# Patient Record
Sex: FEMALE | Race: OTHER | HISPANIC OR LATINO | URBAN - METROPOLITAN AREA
[De-identification: names, ages, dates, MRNs, and addresses within clinical notes are randomized per-mention and may not be internally consistent; named-entity substitution may affect disease eponyms.]

---

## 2021-04-29 ENCOUNTER — EMERGENCY (EMERGENCY)
Facility: HOSPITAL | Age: 71
LOS: 1 days | Discharge: HOME | End: 2021-04-29
Admitting: EMERGENCY MEDICINE
Payer: MEDICARE

## 2021-04-29 VITALS
SYSTOLIC BLOOD PRESSURE: 130 MMHG | RESPIRATION RATE: 16 BRPM | OXYGEN SATURATION: 98 % | HEART RATE: 81 BPM | DIASTOLIC BLOOD PRESSURE: 78 MMHG | TEMPERATURE: 98 F

## 2021-04-29 LAB — SARS-COV-2 RNA SPEC QL NAA+PROBE: SIGNIFICANT CHANGE UP

## 2021-04-29 PROCEDURE — 99282 EMERGENCY DEPT VISIT SF MDM: CPT

## 2021-04-29 PROCEDURE — U0003: CPT

## 2021-04-29 PROCEDURE — U0005: CPT

## 2021-04-29 PROCEDURE — 99283 EMERGENCY DEPT VISIT LOW MDM: CPT

## 2021-04-29 NOTE — ED PROVIDER NOTE - PATIENT PORTAL LINK FT
You can access the FollowMyHealth Patient Portal offered by [facility] by registering at the following website: [email or web address]. By joining [facility]’s FollowMyHealth portal, you will also be able to view your health information using other applications (apps) compatible with our system.

## 2021-05-03 DIAGNOSIS — Z20.822 CONTACT WITH AND (SUSPECTED) EXPOSURE TO COVID-19: ICD-10-CM

## 2025-05-13 ENCOUNTER — APPOINTMENT (OUTPATIENT)
Dept: GASTROENTEROLOGY | Facility: CLINIC | Age: 75
End: 2025-05-13
Payer: MEDICARE

## 2025-05-13 ENCOUNTER — NON-APPOINTMENT (OUTPATIENT)
Age: 75
End: 2025-05-13

## 2025-05-13 VITALS
DIASTOLIC BLOOD PRESSURE: 81 MMHG | SYSTOLIC BLOOD PRESSURE: 158 MMHG | RESPIRATION RATE: 18 BRPM | OXYGEN SATURATION: 95 % | TEMPERATURE: 98 F | WEIGHT: 175.05 LBS | HEIGHT: 64 IN | HEART RATE: 92 BPM

## 2025-05-13 VITALS
TEMPERATURE: 97.9 F | WEIGHT: 173 LBS | OXYGEN SATURATION: 97 % | SYSTOLIC BLOOD PRESSURE: 136 MMHG | BODY MASS INDEX: 27.8 KG/M2 | HEIGHT: 66 IN | DIASTOLIC BLOOD PRESSURE: 100 MMHG | HEART RATE: 82 BPM

## 2025-05-13 PROBLEM — Z00.00 ENCOUNTER FOR PREVENTIVE HEALTH EXAMINATION: Status: ACTIVE | Noted: 2025-05-13

## 2025-05-13 LAB
ALBUMIN SERPL ELPH-MCNC: 4.1 G/DL — SIGNIFICANT CHANGE UP (ref 3.3–5)
ALP SERPL-CCNC: 72 U/L — SIGNIFICANT CHANGE UP (ref 40–120)
ALT FLD-CCNC: 17 U/L — SIGNIFICANT CHANGE UP (ref 10–45)
ANION GAP SERPL CALC-SCNC: 14 MMOL/L — SIGNIFICANT CHANGE UP (ref 5–17)
AST SERPL-CCNC: 23 U/L — SIGNIFICANT CHANGE UP (ref 10–40)
BASOPHILS # BLD AUTO: 0.08 K/UL — SIGNIFICANT CHANGE UP (ref 0–0.2)
BASOPHILS NFR BLD AUTO: 1 % — SIGNIFICANT CHANGE UP (ref 0–2)
BILIRUB SERPL-MCNC: 0.8 MG/DL — SIGNIFICANT CHANGE UP (ref 0.2–1.2)
BUN SERPL-MCNC: 14 MG/DL — SIGNIFICANT CHANGE UP (ref 7–23)
CALCIUM SERPL-MCNC: 9.7 MG/DL — SIGNIFICANT CHANGE UP (ref 8.4–10.5)
CHLORIDE SERPL-SCNC: 97 MMOL/L — SIGNIFICANT CHANGE UP (ref 96–108)
CO2 SERPL-SCNC: 26 MMOL/L — SIGNIFICANT CHANGE UP (ref 22–31)
CREAT SERPL-MCNC: 0.93 MG/DL — SIGNIFICANT CHANGE UP (ref 0.5–1.3)
EGFR: 64 ML/MIN/1.73M2 — SIGNIFICANT CHANGE UP
EGFR: 64 ML/MIN/1.73M2 — SIGNIFICANT CHANGE UP
EOSINOPHIL # BLD AUTO: 0.04 K/UL — SIGNIFICANT CHANGE UP (ref 0–0.5)
EOSINOPHIL NFR BLD AUTO: 0.5 % — SIGNIFICANT CHANGE UP (ref 0–6)
GLUCOSE SERPL-MCNC: 109 MG/DL — HIGH (ref 70–99)
HCT VFR BLD CALC: 44.3 % — SIGNIFICANT CHANGE UP (ref 34.5–45)
HGB BLD-MCNC: 14.5 G/DL — SIGNIFICANT CHANGE UP (ref 11.5–15.5)
IMM GRANULOCYTES NFR BLD AUTO: 0.4 % — SIGNIFICANT CHANGE UP (ref 0–0.9)
LIDOCAIN IGE QN: 29 U/L — SIGNIFICANT CHANGE UP (ref 7–60)
LYMPHOCYTES # BLD AUTO: 1.27 K/UL — SIGNIFICANT CHANGE UP (ref 1–3.3)
LYMPHOCYTES # BLD AUTO: 16.5 % — SIGNIFICANT CHANGE UP (ref 13–44)
MAGNESIUM SERPL-MCNC: 2.2 MG/DL — SIGNIFICANT CHANGE UP (ref 1.6–2.6)
MCHC RBC-ENTMCNC: 28.5 PG — SIGNIFICANT CHANGE UP (ref 27–34)
MCHC RBC-ENTMCNC: 32.7 G/DL — SIGNIFICANT CHANGE UP (ref 32–36)
MCV RBC AUTO: 87.2 FL — SIGNIFICANT CHANGE UP (ref 80–100)
MONOCYTES # BLD AUTO: 0.83 K/UL — SIGNIFICANT CHANGE UP (ref 0–0.9)
MONOCYTES NFR BLD AUTO: 10.8 % — SIGNIFICANT CHANGE UP (ref 2–14)
NEUTROPHILS # BLD AUTO: 5.44 K/UL — SIGNIFICANT CHANGE UP (ref 1.8–7.4)
NEUTROPHILS NFR BLD AUTO: 70.8 % — SIGNIFICANT CHANGE UP (ref 43–77)
NRBC BLD AUTO-RTO: 0 /100 WBCS — SIGNIFICANT CHANGE UP (ref 0–0)
PLATELET # BLD AUTO: 182 K/UL — SIGNIFICANT CHANGE UP (ref 150–400)
POTASSIUM SERPL-MCNC: 4.8 MMOL/L — SIGNIFICANT CHANGE UP (ref 3.5–5.3)
POTASSIUM SERPL-SCNC: 4.8 MMOL/L — SIGNIFICANT CHANGE UP (ref 3.5–5.3)
PROT SERPL-MCNC: 6.9 G/DL — SIGNIFICANT CHANGE UP (ref 6–8.3)
RBC # BLD: 5.08 M/UL — SIGNIFICANT CHANGE UP (ref 3.8–5.2)
RBC # FLD: 14.5 % — SIGNIFICANT CHANGE UP (ref 10.3–14.5)
SODIUM SERPL-SCNC: 137 MMOL/L — SIGNIFICANT CHANGE UP (ref 135–145)
TROPONIN T, HIGH SENSITIVITY RESULT: 8 NG/L — SIGNIFICANT CHANGE UP (ref 0–51)
WBC # BLD: 7.69 K/UL — SIGNIFICANT CHANGE UP (ref 3.8–10.5)
WBC # FLD AUTO: 7.69 K/UL — SIGNIFICANT CHANGE UP (ref 3.8–10.5)

## 2025-05-13 PROCEDURE — 99205 OFFICE O/P NEW HI 60 MIN: CPT

## 2025-05-13 PROCEDURE — 99285 EMERGENCY DEPT VISIT HI MDM: CPT

## 2025-05-13 RX ORDER — IOHEXOL 350 MG/ML
30 INJECTION, SOLUTION INTRAVENOUS ONCE
Refills: 0 | Status: COMPLETED | OUTPATIENT
Start: 2025-05-13 | End: 2025-05-13

## 2025-05-13 RX ADMIN — IOHEXOL 30 MILLILITER(S): 350 INJECTION, SOLUTION INTRAVENOUS at 22:55

## 2025-05-13 NOTE — ED ADULT TRIAGE NOTE - AS HEIGHT TYPE
----- Message from Yuliana Dias sent at 12/12/2019  9:35 AM CST -----  Hello!Makayla Candice ([birth year]) was unable to collect her urine sample today.  She will return a sample on Monday when she comes for her appointment.  Please put in a new order.  I will cancel today's order.   Thank You!  Yuliana    
UACS ordered.  
stated

## 2025-05-13 NOTE — ED ADULT NURSE NOTE - OBJECTIVE STATEMENT
Patient to the ED with complaint of upper epigastric pain nausea and vomiting, associated with decreased PO intake and BM, worsening over the past 2 weeks, seen at another facility as per family, patient diagnosed with hernia, and new onset Afib, unable to take medication ordered.  Reports new complaint of burning in upper chest area, dark urine and SOB on deep inhalation.  Denies fevers, chills.

## 2025-05-14 ENCOUNTER — APPOINTMENT (OUTPATIENT)
Dept: GASTROENTEROLOGY | Facility: CLINIC | Age: 75
End: 2025-05-14

## 2025-05-14 ENCOUNTER — INPATIENT (INPATIENT)
Facility: HOSPITAL | Age: 75
LOS: 1 days | Discharge: HOME | DRG: 392 | End: 2025-05-16
Attending: STUDENT IN AN ORGANIZED HEALTH CARE EDUCATION/TRAINING PROGRAM | Admitting: STUDENT IN AN ORGANIZED HEALTH CARE EDUCATION/TRAINING PROGRAM
Payer: MEDICARE

## 2025-05-14 DIAGNOSIS — I10 ESSENTIAL (PRIMARY) HYPERTENSION: ICD-10-CM

## 2025-05-14 DIAGNOSIS — R62.7 ADULT FAILURE TO THRIVE: ICD-10-CM

## 2025-05-14 DIAGNOSIS — Z29.9 ENCOUNTER FOR PROPHYLACTIC MEASURES, UNSPECIFIED: ICD-10-CM

## 2025-05-14 DIAGNOSIS — I48.91 UNSPECIFIED ATRIAL FIBRILLATION: ICD-10-CM

## 2025-05-14 DIAGNOSIS — Z74.09 OTHER REDUCED MOBILITY: ICD-10-CM

## 2025-05-14 LAB
ADD ON TEST-SPECIMEN IN LAB: SIGNIFICANT CHANGE UP
APPEARANCE UR: CLEAR — SIGNIFICANT CHANGE UP
BILIRUB UR-MCNC: NEGATIVE — SIGNIFICANT CHANGE UP
COLOR SPEC: YELLOW — SIGNIFICANT CHANGE UP
DIFF PNL FLD: NEGATIVE — SIGNIFICANT CHANGE UP
GLUCOSE UR QL: NEGATIVE MG/DL — SIGNIFICANT CHANGE UP
KETONES UR QL: 40 MG/DL
LEUKOCYTE ESTERASE UR-ACNC: NEGATIVE — SIGNIFICANT CHANGE UP
NITRITE UR-MCNC: NEGATIVE — SIGNIFICANT CHANGE UP
PH UR: 6 — SIGNIFICANT CHANGE UP (ref 5–8)
PROT UR-MCNC: NEGATIVE MG/DL — SIGNIFICANT CHANGE UP
SP GR SPEC: 1.01 — SIGNIFICANT CHANGE UP (ref 1–1.03)
UROBILINOGEN FLD QL: 1 MG/DL — SIGNIFICANT CHANGE UP (ref 0.2–1)

## 2025-05-14 PROCEDURE — 99222 1ST HOSP IP/OBS MODERATE 55: CPT | Mod: GC

## 2025-05-14 PROCEDURE — 99223 1ST HOSP IP/OBS HIGH 75: CPT | Mod: GC

## 2025-05-14 PROCEDURE — 76705 ECHO EXAM OF ABDOMEN: CPT | Mod: 26

## 2025-05-14 PROCEDURE — 93010 ELECTROCARDIOGRAM REPORT: CPT

## 2025-05-14 PROCEDURE — 74177 CT ABD & PELVIS W/CONTRAST: CPT | Mod: 26

## 2025-05-14 PROCEDURE — 99221 1ST HOSP IP/OBS SF/LOW 40: CPT

## 2025-05-14 PROCEDURE — 71045 X-RAY EXAM CHEST 1 VIEW: CPT | Mod: 26

## 2025-05-14 RX ORDER — ONDANSETRON HCL/PF 4 MG/2 ML
4 VIAL (ML) INJECTION EVERY 8 HOURS
Refills: 0 | Status: DISCONTINUED | OUTPATIENT
Start: 2025-05-14 | End: 2025-05-16

## 2025-05-14 RX ORDER — APIXABAN 2.5 MG/1
5 TABLET, FILM COATED ORAL EVERY 12 HOURS
Refills: 0 | Status: DISCONTINUED | OUTPATIENT
Start: 2025-05-14 | End: 2025-05-14

## 2025-05-14 RX ORDER — AMLODIPINE BESYLATE 10 MG/1
5 TABLET ORAL EVERY 24 HOURS
Refills: 0 | Status: DISCONTINUED | OUTPATIENT
Start: 2025-05-14 | End: 2025-05-14

## 2025-05-14 RX ORDER — SODIUM CHLORIDE 9 G/1000ML
1000 INJECTION, SOLUTION INTRAVENOUS ONCE
Refills: 0 | Status: COMPLETED | OUTPATIENT
Start: 2025-05-14 | End: 2025-05-14

## 2025-05-14 RX ORDER — MELATONIN 5 MG
3 TABLET ORAL AT BEDTIME
Refills: 0 | Status: DISCONTINUED | OUTPATIENT
Start: 2025-05-14 | End: 2025-05-16

## 2025-05-14 RX ORDER — METOPROLOL SUCCINATE 50 MG/1
25 TABLET, EXTENDED RELEASE ORAL DAILY
Refills: 0 | Status: DISCONTINUED | OUTPATIENT
Start: 2025-05-14 | End: 2025-05-16

## 2025-05-14 RX ORDER — AMLODIPINE BESYLATE 10 MG/1
5 TABLET ORAL EVERY 24 HOURS
Refills: 0 | Status: DISCONTINUED | OUTPATIENT
Start: 2025-05-15 | End: 2025-05-16

## 2025-05-14 RX ORDER — ACETAMINOPHEN 500 MG/5ML
1000 LIQUID (ML) ORAL ONCE
Refills: 0 | Status: COMPLETED | OUTPATIENT
Start: 2025-05-14 | End: 2025-05-14

## 2025-05-14 RX ORDER — ACETAMINOPHEN 500 MG/5ML
650 LIQUID (ML) ORAL EVERY 6 HOURS
Refills: 0 | Status: DISCONTINUED | OUTPATIENT
Start: 2025-05-14 | End: 2025-05-16

## 2025-05-14 RX ORDER — APIXABAN 2.5 MG/1
0 TABLET, FILM COATED ORAL
Qty: 0 | Refills: 0 | DISCHARGE

## 2025-05-14 RX ORDER — ONDANSETRON HCL/PF 4 MG/2 ML
4 VIAL (ML) INJECTION ONCE
Refills: 0 | Status: COMPLETED | OUTPATIENT
Start: 2025-05-14 | End: 2025-05-14

## 2025-05-14 RX ORDER — POLYETHYLENE GLYCOL-3350 AND ELECTROLYTES 236; 6.74; 5.86; 2.97; 22.74 G/274.31G; G/274.31G; G/274.31G; G/274.31G; G/274.31G
4000 POWDER, FOR SOLUTION ORAL ONCE
Refills: 0 | Status: COMPLETED | OUTPATIENT
Start: 2025-05-14 | End: 2025-05-14

## 2025-05-14 RX ORDER — MAGNESIUM, ALUMINUM HYDROXIDE 200-200 MG
30 TABLET,CHEWABLE ORAL EVERY 4 HOURS
Refills: 0 | Status: DISCONTINUED | OUTPATIENT
Start: 2025-05-14 | End: 2025-05-16

## 2025-05-14 RX ORDER — MAGNESIUM CITRATE
296 SOLUTION, ORAL ORAL ONCE
Refills: 0 | Status: COMPLETED | OUTPATIENT
Start: 2025-05-14 | End: 2025-05-14

## 2025-05-14 RX ORDER — B1/B2/B3/B5/B6/B12/VIT C/FOLIC 500-0.5 MG
1 TABLET ORAL EVERY 24 HOURS
Refills: 0 | Status: DISCONTINUED | OUTPATIENT
Start: 2025-05-15 | End: 2025-05-16

## 2025-05-14 RX ADMIN — Medication 4 MILLIGRAM(S): at 00:25

## 2025-05-14 RX ADMIN — POLYETHYLENE GLYCOL-3350 AND ELECTROLYTES 4000 MILLILITER(S): 236; 6.74; 5.86; 2.97; 22.74 POWDER, FOR SOLUTION ORAL at 18:15

## 2025-05-14 RX ADMIN — Medication 40 MILLIGRAM(S): at 18:15

## 2025-05-14 RX ADMIN — SODIUM CHLORIDE 1000 MILLILITER(S): 9 INJECTION, SOLUTION INTRAVENOUS at 00:52

## 2025-05-14 RX ADMIN — METOPROLOL SUCCINATE 25 MILLIGRAM(S): 50 TABLET, EXTENDED RELEASE ORAL at 07:17

## 2025-05-14 RX ADMIN — Medication 400 MILLIGRAM(S): at 01:10

## 2025-05-14 RX ADMIN — AMLODIPINE BESYLATE 5 MILLIGRAM(S): 10 TABLET ORAL at 14:03

## 2025-05-14 RX ADMIN — Medication 296 MILLILITER(S): at 15:47

## 2025-05-14 RX ADMIN — Medication 20 MILLIGRAM(S): at 12:26

## 2025-05-14 RX ADMIN — Medication 20 MILLIGRAM(S): at 00:24

## 2025-05-14 NOTE — H&P ADULT - HISTORY OF PRESENT ILLNESS
75F PMHx Afib (on Eliquis), HTN, RA, severe OA, uterine fibroids, duodenal diverticulum, hepatic steatosis who presented to outpatient GI this AM (Dr. Barillas) for peristent abdominal pain and inability to tolerate PO intake.  Patient's symptoms started shortly after 4/28/25 (with recent fall on 4/19/25) (CTH negative for intracranial pathology) and were described as an epigastric burning sensation with radiation to the right side of her back. She to [facility] ED x2.  At the time of her first presentation on 5/2 she was found to have newly developed afib and was started on eliquis/metoprolol. CT Angio A/P obtained at the time was significant for cholehtiasis but there was no evidence of vascular compromise or obstructive based pathology which overall did not necessitate inpatient surgical management. At the time of her second presentation on 5/7 was provided with supportive care at the time with no resolution of symptoms.     SInce her smyptoms began tai renae has been unable to toelrate oral intake and vomits after eating. She has lost 20lbs since the onset of her symtpoms and even after attmepting to drink orange juice today was profusely vomiting.  Given her inability to tolerate oral intake and ongoing pain was recomcended to present to [facility] for expedited evaluation     ED Course   Vitals: Afebrile, /81, HR 92, SpO2 95% on room air   Labs: CBC/CMP grossly unremarkable, UA with Ketones  ECG: Afib,    Imaging - CT AP: demosntrating Cholelithiasis with stones located in the region of gallbladder neck. No   pericholecystic fluid or surrounding inflammatory stranding..  Interventions: 1g ofirmev, 20mg IV famotidine, 4mg Zofran, 1L LR     PSHx- no prior abdominal surgeries   FMHX- no known fm,hx of CRC or GI releated malignancy      75F PMHx Afib (on Eliquis), HTN, RA, severe OA, uterine fibroids, duodenal diverticulum, hepatic steatosis who presented to outpatient GI this AM (Dr. Barillas) for persistent abdominal pain and inability to tolerate PO intake.  Patient's symptoms started shortly after 4/28/25 (with recent fall on 4/19/25) (CTH negative for intracranial pathology) and were described as an epigastric burning sensation with radiation to the right side of her back. She to [facility] ED x2.  At the time of her first presentation on 5/2 she was found to have newly developed afib and was started on eliquis/metoprolol. CT Angio A/P obtained at the time was significant for cholelithiases but there was no evidence of vascular compromise or obstructive based pathology which overall did not necessitate inpatient surgical management. At the time of her second presentation on 5/7 was provided with supportive care at the time with no resolution of symptoms.     Since her symptoms began the patient has been unable to tolerate oral intake and vomits after eating. She has lost 20lbs since the onset of her symptoms and even after attempting to drink orange juice today was profusely vomiting.  Given her inability to tolerate oral intake and ongoing pain was recommended to present to [facility] for expedited evaluation     ED Course   Vitals: Afebrile, /81, HR 92, SpO2 95% on room air   Labs: CBC/CMP grossly unremarkable, UA with Ketones  ECG: Afib,    Imaging - CT AP: demonstrating Cholelithiasis with stones located in the region of gallbladder neck. No   pericholecystic fluid or surrounding inflammatory stranding..  Interventions: 1g ofirmev, 20mg IV famotidine, 4mg Zofran, 1L LR     PSHx- no prior abdominal surgeries   FMHX- no known fm,hx of CRC or GI releated malignancy

## 2025-05-14 NOTE — CONSULT NOTE ADULT - SUBJECTIVE AND OBJECTIVE BOX
GASTROENTEROLOGY CONSULT NOTE  HPI:  75F PMHx Afib (on Eliquis), HTN, RA, severe OA, uterine fibroids, duodenal diverticulum, hepatic steatosis who presented to outpatient GI this AM (Dr. Barillas) for persistent abdominal pain and inability to tolerate PO intake.  Patient's symptoms started shortly after 4/28/25 (with recent fall on 4/19/25) (CTH negative for intracranial pathology) and were described as an epigastric burning sensation with radiation to the right side of her back. She to [facility] ED x2.  At the time of her first presentation on 5/2 she was found to have newly developed afib and was started on eliquis/metoprolol. CT Angio A/P obtained at the time was significant for cholelithiases but there was no evidence of vascular compromise or obstructive based pathology which overall did not necessitate inpatient surgical management. At the time of her second presentation on 5/7 was provided with supportive care at the time with no resolution of symptoms.     Since her symptoms began the patient has been unable to tolerate oral intake and vomits after eating. She has lost 20lbs since the onset of her symptoms and even after attempting to drink orange juice today was profusely vomiting.  Given her inability to tolerate oral intake and ongoing pain was recommended to present to [facility] for expedited evaluation     ED Course   Vitals: Afebrile, /81, HR 92, SpO2 95% on room air   Labs: CBC/CMP grossly unremarkable, UA with Ketones  ECG: Afib,    Imaging - CT AP: demonstrating Cholelithiasis with stones located in the region of gallbladder neck. No   pericholecystic fluid or surrounding inflammatory stranding..  Interventions: 1g ofirmev, 20mg IV famotidine, 4mg Zofran, 1L LR     PMHx - Afib, HTN, RA, anxiety  PSHx - no abdominal surgeries  Rx - amlodipine 5 mg daily, omeprazole --> pepcid, miralax, eliquis 5mg bid, gabapentin, metoprolol tartrate er 25 mg daily, valsartan 160 mg daily  Supplements/herbs/OTC - FA  A/C or NSAIDs? - denies  FMHx - no known FMHx of CRC or Gi related malignancy; no IBD  Allergies - NKDA  EtOH - 2-3x/week (drinks a beer)  Smoking - never smoker  Drugs - denies  ?  EGD - doesnt recall  1 month ago, stool based test, doesn't know result for this, Dr anthony ([facility], [address])  No prior colonoscopy      Home Medications:  AMLODIPINE   TAB 5MG:  (14 May 2025 05:31)  ELIQUIS 5 MG Tablet: TAKE 1 TABLET (5 MG TOTAL) BY MOUTH 2 (TWO) TIMES A DAY. (14 May 2025 05:31)  METOPROLOL SUCCINATE ER 25 MG Tablet Extended Release 24 Hour: TAKE 1 TABLET BY MOUTH ONCE A DAY (14 May 2025 05:31)  OMEPRAZOLE DR 40 MG Capsule Delayed Release: TAKE 1 TABLET BY MOUTH ONCE A DAY (14 May 2025 05:31)  VALSARTAN    TAB 160MG:  (14 May 2025 05:31)    MEDICATIONS:  MEDICATIONS  (STANDING):  amLODIPine   Tablet 5 milliGRAM(s) Oral every 24 hours  famotidine Injectable 20 milliGRAM(s) IV Push every 24 hours  magnesium citrate Oral Solution 296 milliLiter(s) Oral once  metoprolol succinate ER 25 milliGRAM(s) Oral daily  pantoprazole  Injectable 40 milliGRAM(s) IV Push every 24 hours    MEDICATIONS  (PRN):  acetaminophen     Tablet .. 650 milliGRAM(s) Oral every 6 hours PRN Temp greater or equal to 38C (100.4F), Mild Pain (1 - 3)  aluminum hydroxide/magnesium hydroxide/simethicone Suspension 30 milliLiter(s) Oral every 4 hours PRN Dyspepsia  melatonin 3 milliGRAM(s) Oral at bedtime PRN Insomnia  ondansetron Injectable 4 milliGRAM(s) IV Push every 8 hours PRN Nausea and/or Vomiting      REVIEW OF SYSTEMS:  All other 10 review of systems is negative unless indicated above.    Vital Signs Last 24 Hrs  T(C): 36.9 (14 May 2025 09:26), Max: 37.1 (13 May 2025 23:51)  T(F): 98.4 (14 May 2025 09:26), Max: 98.8 (13 May 2025 23:51)  HR: 79 (14 May 2025 09:26) (70 - 92)  BP: 154/93 (14 May 2025 09:26) (117/67 - 174/84)  BP(mean): --  RR: 18 (14 May 2025 09:26) (18 - 18)  SpO2: 95% (14 May 2025 09:26) (95% - 97%)    Parameters below as of 14 May 2025 09:26  Patient On (Oxygen Delivery Method): room air      PHYSICAL EXAM:  General: lying in bed, in no acute distress  HEENT: Neck supple, mmm, no jvd  Lungs: Normal respiratory effort, no intercostal retractions  Cardiovascular: regular rate  Abdomen: Soft, non-tender non-distended; No rebound or guarding  Extremities: wwp, no cce  Neurological: LICONA, speech fluent  Skin: Warm and dry. No obvious rash      LABS:                        14.5   7.69  )-----------( 182      ( 13 May 2025 22:14 )             44.3     05-13    137  |  97  |  14  ----------------------------<  109[H]  4.8   |  26  |  0.93    Ca    9.7      13 May 2025 22:14  Mg     2.2     05-13    TPro  6.9  /  Alb  4.1  /  TBili  0.8  /  DBili  x   /  AST  23  /  ALT  17  /  AlkPhos  72  05-13    Urinalysis with Rflx Culture (collected 14 May 2025 00:41)    RADIOLOGY & ADDITIONAL STUDIES:     Reviewed

## 2025-05-14 NOTE — CONSULT NOTE ADULT - ASSESSMENT
75F PMH HTN, Afib (on Eliquis), RA, severe OA, uterine fibroids, duodenal diverticulum, hepatic steatosis who presented to outpatient GI this AM (Dr. Barillas) for persistent abdominal pain, N/V, weight loss and inability to tolerate PO intake, admitted to [facility] for workup and management of these symptoms. GI following, planning for EGD/colonoscopy tomorrow, 5/15. Cardiology consulted for pre-operative assessment prior to EGD/colonoscopy 5/15.    Review of studies:  ECG 5/14/25 4:43: afib, rate 72  ECG 5/14/25 19:05: afib, rate 72    Home meds (cardiac): eliquis 5mg BID, toprol 25mg qd, amlodipine 5mg qd, valsartan 160mg qd    Recommendations:    #Pre-operative CV assessment/evaluation   -patient without known history of CHF, MI, or CAD  -poor historian and unable to clarify true exertional symptoms however activity severely limited by fatigue and OA pain. Poor METS. Denies any chest pain with exertion or at rest.   -trops negative, proBNP 814  -ECG afib, non-ischemic   -on exam patient warm/well-perfused and euvolemic   -if able, try to obtain records from [facility] regarding any echo that was done there surrounding her afib diagnosis. Given possible SOB with exertion would obtain TTE if unable to obtain these records.  -patient is low-intermediate risk for low risk procedure   -c/w home toprol 25mg qd (hold for SBP <100, HR <60)  -c/w home amlodipine 5mg qd (hold for SBP <100, HR <60)  -can hold home valsartan for now, however seems BPs are labile so would restart if SBPs consistently >150      #afib   -currently rate controlled  -CHADSVASC at least 3  -c/w home eliquis 5mg BID. If unable to tolerate PO eliquis would start heparin drip or lovenox for full AC    Preliminary recommendations pending discussion with attending cardiologist   Recommendations not final until attested by attending

## 2025-05-14 NOTE — H&P ADULT - NSHPPHYSICALEXAM_GEN_ALL_CORE
T(C): 36.3 (05-14-25 @ 05:06), Max: 37.1 (05-13-25 @ 23:51)  HR: 75 (05-14-25 @ 05:06) (75 - 92)  BP: 174/84 (05-14-25 @ 05:06) (117/67 - 174/84)  RR: 18 (05-14-25 @ 05:06) (18 - 18)  SpO2: 97% (05-14-25 @ 05:06) (95% - 97%)    CONSTITUTIONAL: NAD   ENMT: MMM   RESP: CTA b/l, no WRR  CV: irregular rythym   GI: tenderness to palpation in all quadrants   MSK: moves all extmies   PSYCH: Appropriate insight/judgment; A+O x 3, mood and affect appropriate, recent/remote memory intact

## 2025-05-14 NOTE — DIETITIAN INITIAL EVALUATION ADULT - NSFNSGIIOFT_GEN_A_CORE
Pt endorsed nausea/emesis (last emesis 5/13) and denied diarrhea/constipation, stated last BM 5/13.

## 2025-05-14 NOTE — DIETITIAN INITIAL EVALUATION ADULT - PROBLEM SELECTOR PLAN 1
Patient with 20lb weight loss since onset of abdominal pain. Unable to tolerate PO intake at this time including medications at this time. GI recommended urgent inpatient evaluation   - f/u GI recs   - maintain NPO status at this time pending provisional GI eval  - Zofran prn for nausea   - tylenols  650mg prn for moderate pain   - convert PO medications to IV alternatives if continues to be unable to tolerate medications   - trend BMP to monitor for lytes derangements   - CT without evidence of obstruction

## 2025-05-14 NOTE — ED PROVIDER NOTE - CLINICAL SUMMARY MEDICAL DECISION MAKING FREE TEXT BOX
Triage VS normal  check labs/electrolytes  symptomatic treatment  CTAP w/ PO/IV contrast  admit to medicine for EGD/colonoscopy

## 2025-05-14 NOTE — CONSULT NOTE ADULT - ATTENDING COMMENTS
75F PMHx Afib (on Eliquis), HTN, RA, severe OA, uterine fibroids, duodenal diverticulum, hepatic steatosis referred by patient (Landencarol Lopez) for evaluation of ongoing abdominal pain, inability to tolerate PO, nausea/vomiting, chronic constipation, and 20+ lb weight loss over the past few months.   Will evaluate upper and lower GI tract for complaints of Nausea and vomiting and weight loss.   Patient to trial mag citrate prep and clear liquid diet   Cardiac optimization  EGD colonoscopy 5/14

## 2025-05-14 NOTE — H&P ADULT - ATTENDING COMMENTS
Ptn states still having epigastric abd pain, tolerated metop pill this AM but intermittently unable to tolerate meds at home.  Able to swallow both solids and liquids however unable to keep down after swallowing and will throw up solids in particular.  Able to keep down water and some seltzer water, symptoms and weight loss ongoing since end of April.  Daughter bedside corroborated account, all questions answered.  On exam, abd distended habitus, tender to palpation in epigastric region, no rebound or guarding, A+Ox4.  - suspect etiology likely worsened gastritis/PUD vs malignancy vs structural (though ptn able to initially swallow), appreciate GI re EGD+/- colonoscopy, though doubt ptn ability to tolerate prep  - no s/s of bleed, labs benign, CTAP only w gallstones.  - medically ready after GI investigation Ptn states still having epigastric abd pain, tolerated metop pill this AM but intermittently unable to tolerate meds at home.  Able to swallow both solids and liquids however unable to keep down after swallowing and will throw up solids in particular.  Able to keep down water and some seltzer water, symptoms and weight loss ongoing since end of April.  Daughter bedside corroborated account, all questions answered.  On exam, abd distended habitus, tender to palpation in epigastric region, no rebound or guarding, A+Ox4.  - suspect etiology likely worsened gastritis/PUD vs malignancy vs structural (though ptn able to initially swallow), appreciate GI re EGD+/- colonoscopy, though doubt ptn ability to tolerate prep  - no s/s of bleed, labs benign, CTAP only w gallstones..  - medically ready after GI investigation

## 2025-05-14 NOTE — DIETITIAN INITIAL EVALUATION ADULT - OTHER CALCULATIONS
*Using +10% ideal body weight (132 pounds)  as pt is >120% ideal body weight. Needs adjusted for advanced age, clinical status. ideal body weight: 120 pounds; % ideal body weight: 146%

## 2025-05-14 NOTE — DIETITIAN INITIAL EVALUATION ADULT - PERTINENT LABORATORY DATA
05-13    137  |  97  |  14  ----------------------------<  109[H]  4.8   |  26  |  0.93    Ca    9.7      13 May 2025 22:14  Mg     2.2     05-13    TPro  6.9  /  Alb  4.1  /  TBili  0.8  /  DBili  x   /  AST  23  /  ALT  17  /  AlkPhos  72  05-13

## 2025-05-14 NOTE — H&P ADULT - NSHPLABSRESULTS_GEN_ALL_CORE
LABS:  cret                        14.5   7.69  )-----------( 182      ( 13 May 2025 22:14 )             44.3     05-13    137  |  97  |  14  ----------------------------<  109[H]  4.8   |  26  |  0.93    Ca    9.7      13 May 2025 22:14  Mg     2.2     05-13    TPro  6.9  /  Alb  4.1  /  TBili  0.8  /  DBili  x   /  AST  23  /  ALT  17  /  AlkPhos  72  05-13

## 2025-05-14 NOTE — ED ADULT NURSE REASSESSMENT NOTE - NS ED NURSE REASSESS COMMENT FT1
Patients b/p 174/84 HR 75 Spoke to Dr. Garcia stated to send patient up, he will have b/p rechecked on arrival administer medication on unit.

## 2025-05-14 NOTE — CONSULT NOTE ADULT - SUBJECTIVE AND OBJECTIVE BOX
HPI: 75F PMH HTN, Afib (on Eliquis), RA, severe OA, uterine fibroids, duodenal diverticulum, hepatic steatosis who presented to outpatient GI this AM (Dr. Barillas) for persistent abdominal pain and inability to tolerate PO intake. Patient's symptoms started shortly after 4/28/25 (with recent fall on 4/19/25) (CTH negative for intracranial pathology) and were described as an epigastric burning sensation with radiation to the right side of her back. She to [facility] ED x2. At the time of her first presentation on 5/2 she was found to have newly developed afib and was started on eliquis/metoprolol. CT Angio A/P obtained at the time was significant for cholelithiases but there was no evidence of vascular compromise or obstructive based pathology which overall did not necessitate inpatient surgical management. At the time of her second presentation on 5/7 was provided with supportive care at the time with no resolution of symptoms. Since her symptoms began the patient has been unable to tolerate oral intake and vomits after eating. She has lost 20lbs since the onset of her symptoms and even after attempting to drink orange juice today was profusely vomiting.  Given her inability to tolerate oral intake and ongoing pain was recommended to present to [facility] for expedited evaluation. CT A/P showing cholelithiasis with stones located in the region of gallbladder neck, no pericholecystic fluid or surrounding inflammatory stranding. Patient admitted to [facility] for workup and management of ongoing abdominal pain, inability to tolerate PO, nausea/vomiting, chronic constipation, and 20+ lb weight loss over the past few months. GI consulted, planning for EGD/colonoscopy tomorrow, 5/15. Cardiology consulted for pre-operative assessment prior to EGD/colonoscopy 5/15. HPI: 75F PMH HTN, Afib (on Eliquis), RA, severe OA, uterine fibroids, duodenal diverticulum, hepatic steatosis who presented to outpatient GI this AM (Dr. Barillas) for persistent abdominal pain and inability to tolerate PO intake. Patient's symptoms started shortly after 25 (with recent fall on 25) (CTH negative for intracranial pathology) and were described as an epigastric burning sensation with radiation to the right side of her back. She to [facility] ED x2. At the time of her first presentation on  she was found to have newly developed afib and was started on eliquis/metoprolol. CT Angio A/P obtained at the time was significant for cholelithiases but there was no evidence of vascular compromise or obstructive based pathology which overall did not necessitate inpatient surgical management. At the time of her second presentation on  was provided with supportive care at the time with no resolution of symptoms. Since her symptoms began the patient has been unable to tolerate oral intake and vomits after eating. She has lost 20lbs since the onset of her symptoms and even after attempting to drink orange juice today was profusely vomiting.  Given her inability to tolerate oral intake and ongoing pain was recommended to present to [facility] for expedited evaluation. CT A/P showing cholelithiasis with stones located in the region of gallbladder neck, no pericholecystic fluid or surrounding inflammatory stranding. Patient admitted to [facility] for workup and management of ongoing abdominal pain, inability to tolerate PO, nausea/vomiting, chronic constipation, and 20+ lb weight loss over the past few months. GI consulted, planning for EGD/colonoscopy tomorrow, 5/15. Cardiology consulted for pre-operative assessment prior to EGD/colonoscopy 5/15.    SUBJECTIVE / CONSULTANT HPI: Patient seen and examined at bedside. Currently denies any chest pain or SOB but admits to abdominal pain and nausea. Denies history of CAD, MI, cardiac stents, CHF or stroke. Reports she has not been taking her medications consistently the past few weeks    #[id number] Barber used for interview     PHYSICAL EXAM:    General: lying in bed in NAD   HEENT: NC/AT; MMM  Cardiovascular: +S1/S2, irregular rhythm, no murmurs   Respiratory: CTA B/L; no W/R/C  Gastrointestinal: +TTP epigastric region   Extremities: WWP; b/l LE non-pitting edema at ankles with b/l varicose veins/venous stasis changes   Neurological: AAOx3    VITAL SIGNS:  Vital Signs Last 24 Hrs  T(C): 36.4 (14 May 2025 21:22), Max: 37.2 (14 May 2025 16:12)  T(F): 97.6 (14 May 2025 21:22), Max: 98.9 (14 May 2025 16:12)  HR: 70 (14 May 2025 21:22) (66 - 79)  BP: 105/69 (14 May 2025 21:22) (105/69 - 174/84)  BP(mean): 106 (14 May 2025 16:12) (106 - 106)  RR: 18 (14 May 2025 21:22) (18 - 18)  SpO2: 95% (14 May 2025 21:22) (95% - 97%)    Parameters below as of 14 May 2025 21:22  Patient On (Oxygen Delivery Method): room air          MEDICATIONS:  MEDICATIONS  (STANDING):  famotidine Injectable 20 milliGRAM(s) IV Push every 24 hours  metoprolol succinate ER 25 milliGRAM(s) Oral daily  pantoprazole  Injectable 40 milliGRAM(s) IV Push every 24 hours    MEDICATIONS  (PRN):  acetaminophen     Tablet .. 650 milliGRAM(s) Oral every 6 hours PRN Temp greater or equal to 38C (100.4F), Mild Pain (1 - 3)  aluminum hydroxide/magnesium hydroxide/simethicone Suspension 30 milliLiter(s) Oral every 4 hours PRN Dyspepsia  melatonin 3 milliGRAM(s) Oral at bedtime PRN Insomnia  ondansetron Injectable 4 milliGRAM(s) IV Push every 8 hours PRN Nausea and/or Vomiting      ALLERGIES:  Allergies    No Known Allergies    Intolerances        LABS:                        14.5   7.69  )-----------( 182      ( 13 May 2025 22:14 )             44.3     05-13    137  |  97  |  14  ----------------------------<  109[H]  4.8   |  26  |  0.93    Ca    9.7      13 May 2025 22:14  Mg     2.2     05-13    TPro  6.9  /  Alb  4.1  /  TBili  0.8  /  DBili  x   /  AST  23  /  ALT  17  /  AlkPhos  72  05-13      Urinalysis Basic - ( 14 May 2025 00:41 )    Color: Yellow / Appearance: Clear / S.009 / pH: x  Gluc: x / Ketone: x  / Bili: Negative / Urobili: 1.0 mg/dL   Blood: x / Protein: Negative mg/dL / Nitrite: Negative   Leuk Esterase: Negative / RBC: x / WBC x   Sq Epi: x / Non Sq Epi: x / Bacteria: x      CAPILLARY BLOOD GLUCOSE          RADIOLOGY & ADDITIONAL TESTS: Reviewed. HPI: 75F PMH HTN, Afib (on Eliquis), RA, severe OA, uterine fibroids, duodenal diverticulum, hepatic steatosis who presented to outpatient GI this AM (Dr. Barillas) for persistent abdominal pain and inability to tolerate PO intake. Patient's symptoms started shortly after 25 (with recent fall on 25) (CTH negative for intracranial pathology) and were described as an epigastric burning sensation with radiation to the right side of her back. She to [facility] ED x2. At the time of her first presentation on  she was found to have newly developed afib and was started on eliquis/metoprolol. CT Angio A/P obtained at the time was significant for cholelithiases but there was no evidence of vascular compromise or obstructive based pathology which overall did not necessitate inpatient surgical management. At the time of her second presentation on  was provided with supportive care at the time with no resolution of symptoms. Since her symptoms began the patient has been unable to tolerate oral intake and vomits after eating. She has lost 20lbs since the onset of her symptoms and even after attempting to drink orange juice today was profusely vomiting.  Given her inability to tolerate oral intake and ongoing pain was recommended to present to [facility] for expedited evaluation. CT A/P showing cholelithiasis with stones located in the region of gallbladder neck, no pericholecystic fluid or surrounding inflammatory stranding. Patient admitted to [facility] for workup and management of ongoing abdominal pain, inability to tolerate PO, nausea/vomiting, chronic constipation, and 20+ lb weight loss over the past few months. GI consulted, planning for EGD/colonoscopy tomorrow, 5/15. Cardiology consulted for pre-operative assessment prior to EGD/colonoscopy 5/15.    SUBJECTIVE / CONSULTANT HPI: Patient seen and examined at bedside. Currently denies any chest pain or SOB but admits to abdominal pain and nausea. Denies history of CAD, MI, cardiac stents, CHF or stroke. Reports she has not been taking her medications consistently the past few weeks due to nausea and vomiting and inability to keep them down. Ambulates with both cane and walker but also needs assistance with walking. Reports her activity is severely limited by diffuse body pain/aches and leg pain 2/2 to her arthritis. When asked if she gets any chest pain or SOB with ambulating, she says she can barely make it 1-2 blocks without getting chest pain and SOB. However when asked where the chest pain was she pointed to her epigastric region- says she has a hernia there that burns a lot. Says she also gets very tired with minimal exertion lately. She stated that she "suffocates" when she walks however when asked how far she can walk until she suffocates she says that she just keeps walking and does not need to stop and catch her breath. She reports nausea and vomiting for the past 4 weeks but prior to that she said she was not having any chest pain or SOB with walking. Just gets tired after 1 block. Says her whole body hurts. Says she sees a physician Dr. Christensen but unclear if he is a cardiologist? Otherwise does not follow with a cardiologist as outpatient. Reports she was at [facility] a few weeks ago for a fall and was given eliquis at that time. Says no one told her she has afib, she was unaware of this, she was just given new medications by a doctor at [facility] but she does not know what they were for.    #[id number] Barber used for interview     PHYSICAL EXAM:    General: lying in bed in NAD   HEENT: NC/AT; MMM  Cardiovascular: +S1/S2, irregular rhythm, no murmurs   Respiratory: CTA B/L; no W/R/C  Gastrointestinal: +TTP epigastric region   Extremities: WWP; b/l LE non-pitting edema at ankles with b/l varicose veins/venous stasis changes   Neurological: AAOx3    VITAL SIGNS:  Vital Signs Last 24 Hrs  T(C): 36.4 (14 May 2025 21:22), Max: 37.2 (14 May 2025 16:12)  T(F): 97.6 (14 May 2025 21:22), Max: 98.9 (14 May 2025 16:12)  HR: 70 (14 May 2025 21:22) (66 - 79)  BP: 105/69 (14 May 2025 21:22) (105/69 - 174/84)  BP(mean): 106 (14 May 2025 16:12) (106 - 106)  RR: 18 (14 May 2025 21:22) (18 - 18)  SpO2: 95% (14 May 2025 21:22) (95% - 97%)    Parameters below as of 14 May 2025 21:22  Patient On (Oxygen Delivery Method): room air          MEDICATIONS:  MEDICATIONS  (STANDING):  famotidine Injectable 20 milliGRAM(s) IV Push every 24 hours  metoprolol succinate ER 25 milliGRAM(s) Oral daily  pantoprazole  Injectable 40 milliGRAM(s) IV Push every 24 hours    MEDICATIONS  (PRN):  acetaminophen     Tablet .. 650 milliGRAM(s) Oral every 6 hours PRN Temp greater or equal to 38C (100.4F), Mild Pain (1 - 3)  aluminum hydroxide/magnesium hydroxide/simethicone Suspension 30 milliLiter(s) Oral every 4 hours PRN Dyspepsia  melatonin 3 milliGRAM(s) Oral at bedtime PRN Insomnia  ondansetron Injectable 4 milliGRAM(s) IV Push every 8 hours PRN Nausea and/or Vomiting      ALLERGIES:  Allergies    No Known Allergies    Intolerances        LABS:                        14.5   7.69  )-----------( 182      ( 13 May 2025 22:14 )             44.3     05-    137  |  97  |  14  ----------------------------<  109[H]  4.8   |  26  |  0.93    Ca    9.7      13 May 2025 22:14  Mg     2.2     -    TPro  6.9  /  Alb  4.1  /  TBili  0.8  /  DBili  x   /  AST  23  /  ALT  17  /  AlkPhos  72  05-13      Urinalysis Basic - ( 14 May 2025 00:41 )    Color: Yellow / Appearance: Clear / S.009 / pH: x  Gluc: x / Ketone: x  / Bili: Negative / Urobili: 1.0 mg/dL   Blood: x / Protein: Negative mg/dL / Nitrite: Negative   Leuk Esterase: Negative / RBC: x / WBC x   Sq Epi: x / Non Sq Epi: x / Bacteria: x      CAPILLARY BLOOD GLUCOSE          RADIOLOGY & ADDITIONAL TESTS: Reviewed.

## 2025-05-14 NOTE — PROGRESS NOTE ADULT - PROBLEM SELECTOR PLAN 5
Fluids:s/p LR bolus   Electrolytes: replete prn   Diet: NPO   DVT PPx: eliquis   GI PPx:  PPI   Dispo: [facility] Fluids: s/p 1L LR bolus   Electrolytes: replete prn   Diet: NPO except medications  DVT PPx: previously on eliquis   GI PPx: PPI   Dispo: F

## 2025-05-14 NOTE — PROGRESS NOTE ADULT - PROBLEM SELECTOR PLAN 1
Patient with 20lb weight loss since onset of abdominal pain. Unable to tolerate PO intake at this time including medications at this time. GI recommended urgent inpatient evaluation   - f/u GI recs   - maintain NPO status except medications pending further GI   - Zofran prn for nausea   - tylenols  650mg prn for moderate pain   - convert PO medications to IV alternatives if continues to be unable to tolerate medications   - trend BMP to monitor for lytes derangements   - CT without evidence of obstruction Patient with 20lb weight loss since onset of abdominal pain. Unable to tolerate PO intake at this time including medications at this time. GI recommended urgent inpatient evaluation.   - f/u GI recs   - maintain NPO status except medications pending further GI   - Zofran prn for nausea   - tylenols  650mg prn for moderate pain   - convert PO medications to IV alternatives if continues to be unable to tolerate medications   - trend BMP to monitor for lytes derangements   - CT without evidence of obstruction Patient with 20lb weight loss since onset of abdominal pain. Unable to tolerate PO intake at this time including medications at this time. GI recommended urgent inpatient evaluation. Noted to have several weeks of abdominal pain, N/V/ constipation. Recent outside non-contrast CT imaging with e/o cholelithiasis but otherwise unrevealing.   - f/u GI recs   - maintain NPO status except medications pending further GI   - Zofran prn for nausea   - tylenols  650mg prn for moderate pain   - convert PO medications to IV alternatives if continues to be unable to tolerate medications   - trend BMP to monitor for lytes derangements   - CT without evidence of obstruction

## 2025-05-14 NOTE — H&P ADULT - PROBLEM SELECTOR PLAN 5
Fluids:  Electrolytes:  Diet: NPO   DVT PPx: eliquis   GI PPx:  PPI   Dispo: [facility] Fluids:s/p LR bolus   Electrolytes: replete prn   Diet: NPO   DVT PPx: eliquis   GI PPx:  PPI   Dispo: [facility] baseline intermittently ambulates w cane or walker at home Referred To Plastics For Closure Text (Leave Blank If You Do Not Want): After obtaining clear surgical margins the patient was sent to plastics for surgical repair.  The patient understands they will receive post-surgical care and follow-up from Dr. Jones.

## 2025-05-14 NOTE — H&P ADULT - PROBLEM SELECTOR PLAN 4
Pt w hx of newly diagnosed afib, started on Eliquis and metoprolol 25mg ER   - has been unable to tolerate Eliquis for the last week, as such can start heparin gtt following coag profile  - can resume    - if cannot tolerate medications by mouth can utilize heparin gtt Pt w hx of newly diagnosed afib, started on Eliquis and metoprolol 25mg ER   - has been unable to tolerate Eliquis/BB for the last week, as such can start heparin gtt following coag profile if still unable to tolerate oral   - can resume  BB via IV route if not tolerting oral

## 2025-05-14 NOTE — PROGRESS NOTE ADULT - SUBJECTIVE AND OBJECTIVE BOX
Patient is a 75y old  Female who presents with a chief complaint of Inability to tolerate PO/ Gastric Pain (14 May 2025 05:04)      OVERNIGHT EVENTS: NAEON    SUBJECTIVE: Patient seen at the bedside this morning with  ([id number]) continues to have nausea and NBNB vomiting states improved with pepcid. No fevers or chills, no SOB. no blood in stool or melena. Chest pain related to retching and 2/2 known hernia. Continues to have epigastric pain.     ROS: otherwise negative      T(C): 36.8 (25 @ 06:39), Max: 37.1 (25 @ 23:51)  HR: 70 (25 @ 06:39) (70 - 92)  BP: 162/98 (25 @ 06:39) (117/67 - 174/84)  RR: 18 (25 @ 06:39) (18 - 18)  SpO2: 96% (25 @ 06:39) (95% - 97%)  Wt(kg): --Vital Signs Last 24 Hrs  T(C): 36.8 (14 May 2025 06:39), Max: 37.1 (13 May 2025 23:51)  T(F): 98.3 (14 May 2025 06:39), Max: 98.8 (13 May 2025 23:51)  HR: 70 (14 May 2025 06:39) (70 - 92)  BP: 162/98 (14 May 2025 06:39) (117/67 - 174/84)  BP(mean): --  RR: 18 (14 May 2025 06:39) (18 - 18)  SpO2: 96% (14 May 2025 06:39) (95% - 97%)    Parameters below as of 14 May 2025 06:39  Patient On (Oxygen Delivery Method): room air        PHYSICAL EXAM:  Constitutional: resting comfortably in bed; NAD  Eyes: PERRL, EOMI  ENT: no nasal discharge; MMM  Neck: supple; no JVD or thyromegaly  Respiratory: CTA B/L; no W/R/R, no retractions  Cardiac: +S1/S2; RRR; no M/R/G  Gastrointestinal: soft, epigastric tenderness, nondistended; no rebound or guarding; +BSx4  Extremities: WWP, no clubbing or cyanosis; no peripheral edema. varicose veins bilaterally. Capillary refill <2 sec  Musculoskeletal: NROM x4; no joint swelling, tenderness or erythema  Vascular: 2+ radial, DP/PT pulses B/L  Dermatologic: skin warm, dry and intact; no rashes, wounds, or scars  Neurologic: AAOx3  Psychiatric: tangential with conversation.     LABS:                        14.5   7.69  )-----------( 182      ( 13 May 2025 22:14 )             44.3     05-    137  |  97  |  14  ----------------------------<  109[H]  4.8   |  26  |  0.93    Ca    9.7      13 May 2025 22:14  Mg     2.2         TPro  6.9  /  Alb  4.1  /  TBili  0.8  /  DBili  x   /  AST  23  /  ALT  17  /  AlkPhos  72  05        Urinalysis Basic - ( 14 May 2025 00:41 )    Color: Yellow / Appearance: Clear / S.009 / pH: x  Gluc: x / Ketone: x  / Bili: Negative / Urobili: 1.0 mg/dL   Blood: x / Protein: Negative mg/dL / Nitrite: Negative   Leuk Esterase: Negative / RBC: x / WBC x   Sq Epi: x / Non Sq Epi: x / Bacteria: x      CAPILLARY BLOOD GLUCOSE            Urinalysis Basic - ( 14 May 2025 00:41 )    Color: Yellow / Appearance: Clear / S.009 / pH: x  Gluc: x / Ketone: x  / Bili: Negative / Urobili: 1.0 mg/dL   Blood: x / Protein: Negative mg/dL / Nitrite: Negative   Leuk Esterase: Negative / RBC: x / WBC x   Sq Epi: x / Non Sq Epi: x / Bacteria: x        MEDICATIONS  (STANDING):  apixaban 5 milliGRAM(s) Oral every 12 hours  metoprolol succinate ER 25 milliGRAM(s) Oral daily    MEDICATIONS  (PRN):  acetaminophen     Tablet .. 650 milliGRAM(s) Oral every 6 hours PRN Temp greater or equal to 38C (100.4F), Mild Pain (1 - 3)  aluminum hydroxide/magnesium hydroxide/simethicone Suspension 30 milliLiter(s) Oral every 4 hours PRN Dyspepsia  melatonin 3 milliGRAM(s) Oral at bedtime PRN Insomnia  ondansetron Injectable 4 milliGRAM(s) IV Push every 8 hours PRN Nausea and/or Vomiting      RADIOLOGY & ADDITIONAL TESTS: Reviewed   Patient is a 75y old  Female who presents with a chief complaint of Inability to tolerate PO/ Gastric Pain (14 May 2025 05:04)      OVERNIGHT EVENTS: NAEON    SUBJECTIVE: Patient seen at the bedside this morning with  ([id number]) continues to have nausea and NBNB vomiting states improved with pepcid. No fevers or chills, no SOB. no blood in stool or melena. Chest pain related to retching and 2/2 known hernia. Continues to have epigastric pain.   no family hx of cancer, no abdominal surgeries in the past.     ROS: otherwise negative      T(C): 36.8 (25 @ 06:39), Max: 37.1 (25 @ 23:51)  HR: 70 (25 @ 06:39) (70 - 92)  BP: 162/98 (25 @ 06:39) (117/67 - 174/84)  RR: 18 (25 @ 06:39) (18 - 18)  SpO2: 96% (25 @ 06:39) (95% - 97%)  Wt(kg): --Vital Signs Last 24 Hrs  T(C): 36.8 (14 May 2025 06:39), Max: 37.1 (13 May 2025 23:51)  T(F): 98.3 (14 May 2025 06:39), Max: 98.8 (13 May 2025 23:51)  HR: 70 (14 May 2025 06:39) (70 - 92)  BP: 162/98 (14 May 2025 06:39) (117/67 - 174/84)  BP(mean): --  RR: 18 (14 May 2025 06:39) (18 - 18)  SpO2: 96% (14 May 2025 06:39) (95% - 97%)    Parameters below as of 14 May 2025 06:39  Patient On (Oxygen Delivery Method): room air        PHYSICAL EXAM:  Constitutional: resting comfortably in bed; NAD  Eyes: PERRL, EOMI  ENT: no nasal discharge; MMM  Neck: supple; no JVD or thyromegaly  Respiratory: CTA B/L; no W/R/R, no retractions  Cardiac: +S1/S2; RRR; no M/R/G  Gastrointestinal: soft, epigastric tenderness, nondistended; no rebound or guarding; +BSx4  Extremities: WWP, no clubbing or cyanosis; no peripheral edema. varicose veins bilaterally. Capillary refill <2 sec  Musculoskeletal: NROM x4; no joint swelling, tenderness or erythema  Vascular: 2+ radial, DP/PT pulses B/L  Dermatologic: skin warm, dry and intact; no rashes, wounds, or scars  Neurologic: AAOx3  Psychiatric: tangential with conversation.     LABS:                        14.5   7.69  )-----------( 182      ( 13 May 2025 22:14 )             44.3     05-    137  |  97  |  14  ----------------------------<  109[H]  4.8   |  26  |  0.93    Ca    9.7      13 May 2025 22:14  Mg     2.2         TPro  6.9  /  Alb  4.1  /  TBili  0.8  /  DBili  x   /  AST  23  /  ALT  17  /  AlkPhos  72  05-        Urinalysis Basic - ( 14 May 2025 00:41 )    Color: Yellow / Appearance: Clear / S.009 / pH: x  Gluc: x / Ketone: x  / Bili: Negative / Urobili: 1.0 mg/dL   Blood: x / Protein: Negative mg/dL / Nitrite: Negative   Leuk Esterase: Negative / RBC: x / WBC x   Sq Epi: x / Non Sq Epi: x / Bacteria: x      CAPILLARY BLOOD GLUCOSE            Urinalysis Basic - ( 14 May 2025 00:41 )    Color: Yellow / Appearance: Clear / S.009 / pH: x  Gluc: x / Ketone: x  / Bili: Negative / Urobili: 1.0 mg/dL   Blood: x / Protein: Negative mg/dL / Nitrite: Negative   Leuk Esterase: Negative / RBC: x / WBC x   Sq Epi: x / Non Sq Epi: x / Bacteria: x        MEDICATIONS  (STANDING):  apixaban 5 milliGRAM(s) Oral every 12 hours  metoprolol succinate ER 25 milliGRAM(s) Oral daily    MEDICATIONS  (PRN):  acetaminophen     Tablet .. 650 milliGRAM(s) Oral every 6 hours PRN Temp greater or equal to 38C (100.4F), Mild Pain (1 - 3)  aluminum hydroxide/magnesium hydroxide/simethicone Suspension 30 milliLiter(s) Oral every 4 hours PRN Dyspepsia  melatonin 3 milliGRAM(s) Oral at bedtime PRN Insomnia  ondansetron Injectable 4 milliGRAM(s) IV Push every 8 hours PRN Nausea and/or Vomiting      RADIOLOGY & ADDITIONAL TESTS: Reviewed

## 2025-05-14 NOTE — PROGRESS NOTE ADULT - PROBLEM SELECTOR PLAN 4
Pt w hx of newly diagnosed afib, started on Eliquis and metoprolol 25mg ER   - has been unable to tolerate Eliquis/BB for the last week, as such can start heparin gtt following coag profile if still unable to tolerate oral   - can resume BB via IV route if not tolerating oral Pt w hx of newly diagnosed afib, started on Eliquis and metoprolol 25mg ER   - has been unable to tolerate Eliquis/BB for the last week, as such can start heparin gtt following coag profile if still unable to tolerate oral   - can resume BB has been tolerating PO medications

## 2025-05-14 NOTE — H&P ADULT - PROBLEM SELECTOR PLAN 2
Patient with several weeks of abdominal pain (See H+P)    per home meds on omeprazole qd  - c/w PPI   - see plan above Patient with several weeks of abdominal pain (See H+P) . PRioar imaging significant for cholelithiasis   per home meds on omeprazole qd  - c/w PPI   - f/u RUQ   - see plan above Patient with several weeks of abdominal pain (See H+P) . Prior imaging significant for cholelithiasis   per home meds on omeprazole qd  - c/w PPI   - f/u RUQ   - see plan above

## 2025-05-14 NOTE — ED PROVIDER NOTE - OBJECTIVE STATEMENT
75yF w/ HTN RA OA, recent diagnosis Afib 2 weeks ago (put on Eliquis and metoprolol), comes in for post-prandial nausea/vomiting, inability to tolerate PO, 15 lb weight loss since April 28. Went to [place] [id number], had CTAP non-con done, was found to have new Afib and admitted for that. per daughter pt unable to tolerate any meds including Eliquis due to vomiting. Pt also endorses generalized abdominal cramping and weakness. Saw GI Dr. Hallman today who referred pt to [facility] for admission for further workup. Pt has never had endoscopy/colonoscopy. Pt also endorses constipation.

## 2025-05-14 NOTE — DIETITIAN INITIAL EVALUATION ADULT - OTHER INFO
Per H&P: 75F PMHx Afib (on Eliquis), HTN, RA, severe OA, uterine fibroids, duodenal diverticulum, hepatic steatosis who presented to outpatient GI this AM (Dr. Barillas) for persistent abdominal pain and inability to tolerate PO intake.  Patient's symptoms started shortly after 4/28/25 (with recent fall on 4/19/25) (CTH negative for intracranial pathology) and were described as an epigastric burning sensation with radiation to the right side of her back. [facility] x2.  At the time of her first presentation on 5/2 she was found to have newly developed afib and was started on eliquis/metoprolol. CT Angio A/P obtained at the time was significant for cholelithiases but there was no evidence of vascular compromise or obstructive based pathology which overall did not necessitate inpatient surgical management. At the time of her second presentation on 5/7 was provided with supportive care at the time with no resolution of symptoms.     Met with pt and daughter (Yoko) this AM at bedside who was able to translate. Pt NPO at time of nutrition visit. No known food allergies nor food intolerances reported at baseline, however daughter mentioned ?dairy intolerance since pt gastric pain/inability to tolerate PO began (may 1, 2025). Daughter mentioned pt with good appetite and PO intake at baseline, however low x 1 month including smell aversions, able to tolerate clear liquids such as Pedialyte and Gatorade well. Daughter mentioned pt drinking clear liquids almost entirely and ~1 cracker/day x 1 month. Pt denied chewing difficulties, endorsed occasional swallowing difficulties, mentioned she feels her throat narrowing and she is choking. Pt endorsed nausea/emesis (last emesis 5/13) and denied diarrhea/constipation, stated last BM 5/13. Per daughter, pt usual body weight 200 pounds early May and endorsed significant weight loss, last weighed at outpatient doctor a few days ago which revealed ~175 pounds, however RD? current or reported weight. RD weighed patient on bedscale which revealed 199.2 pounds, however patient with clothing and sheets on bed therefore unable to assess true weight. RD encouraged adequate PO and protein consumption in order to regain strength, maintain muscle mass and reach adequate nutritional status, once back on PO diet. Daughter was accepting to RD education and asking appropriate questions. Pt at high risk for malnutrition, however does not meet ASPEN criteria at this time. RD appreciates updated weight to reassess malnutrition diagnosis.

## 2025-05-14 NOTE — PROGRESS NOTE ADULT - PROBLEM SELECTOR PLAN 3
PT with hx of HTN per sure scripts on 5mg of amlodipine qd and 160mg valsartan   - c/w meds after confirming med rec PT with hx of HTN per sure scripts on 5mg of amlodipine qd and 160mg valsartan per medications at bedside  - continue to hold home anti-HTN at this time reinitiation if SBP >150

## 2025-05-14 NOTE — PROGRESS NOTE ADULT - PROBLEM SELECTOR PLAN 2
Patient with several weeks of abdominal pain (See H+P) . Prior imaging significant for cholelithiasis   per home meds on omeprazole qd  - c/w PPI   - f/u RUQ   - see plan above Patient with several weeks of abdominal pain (See H+P) . Prior imaging significant for cholelithiasis   per home meds on omeprazole qd. Had been told she had gastritis and ulcers but no EGD performed.   Per Dr. Hallman: patient with no prior EGD or colonoscopy evaluation, warrants endoscopic evaluation to further evaluate symptoms however given inability to tolerate PO and ongoing symptoms, recommended expedited inpatient evaluation.   - was recommended for the following: CT A/P w/ IV/PO contrast including repeat BW including lipase, CBC, CMP   - c/w PPI   - f/u RUQ   - Will work to obtain [facility] and [facility] records for review  - see plan above

## 2025-05-14 NOTE — H&P ADULT - PROBLEM SELECTOR PLAN 1
Patient with 20lb weight loss since onset of abdominal pain. Unable to tolerate PO intake at this time including medications at this time. GI recommended urgent inpatient evaluation   - f/u GI recs   - Zofran prn for nausea   - tylenols  650mg prn for moderate pain   - convert PO medications to IV alternatives if continues to be unable to tolerate medications   - trend BMP to monitor for lytes derangements   - CT without evidence of obstruction Patient with 20lb weight loss since onset of abdominal pain. Unable to tolerate PO intake at this time including medications at this time. GI recommended urgent inpatient evaluation   - f/u GI recs   - maintain NPO status at this time pending provisional GI eval  - Zofran prn for nausea   - tylenols  650mg prn for moderate pain   - convert PO medications to IV alternatives if continues to be unable to tolerate medications   - trend BMP to monitor for lytes derangements   - CT without evidence of obstruction

## 2025-05-14 NOTE — DIETITIAN INITIAL EVALUATION ADULT - PROBLEM SELECTOR PLAN 5
Fluids:s/p LR bolus   Electrolytes: replete prn   Diet: NPO   DVT PPx: eliquis   GI PPx:  PPI   Dispo: [facility]

## 2025-05-14 NOTE — DIETITIAN INITIAL EVALUATION ADULT - PROBLEM SELECTOR PLAN 2
Patient with several weeks of abdominal pain (See H+P) . Prior imaging significant for cholelithiasis   per home meds on omeprazole qd  - c/w PPI   - f/u RUQ   - see plan above

## 2025-05-14 NOTE — DIETITIAN INITIAL EVALUATION ADULT - PERTINENT MEDS FT
MEDICATIONS  (STANDING):  famotidine Injectable 20 milliGRAM(s) IV Push every 24 hours  metoprolol succinate ER 25 milliGRAM(s) Oral daily  pantoprazole  Injectable 40 milliGRAM(s) IV Push every 24 hours    MEDICATIONS  (PRN):  acetaminophen     Tablet .. 650 milliGRAM(s) Oral every 6 hours PRN Temp greater or equal to 38C (100.4F), Mild Pain (1 - 3)  aluminum hydroxide/magnesium hydroxide/simethicone Suspension 30 milliLiter(s) Oral every 4 hours PRN Dyspepsia  melatonin 3 milliGRAM(s) Oral at bedtime PRN Insomnia  ondansetron Injectable 4 milliGRAM(s) IV Push every 8 hours PRN Nausea and/or Vomiting

## 2025-05-14 NOTE — DIETITIAN INITIAL EVALUATION ADULT - PROBLEM SELECTOR PLAN 4
Pt w hx of newly diagnosed afib, started on Eliquis and metoprolol 25mg ER   - has been unable to tolerate Eliquis/BB for the last week, as such can start heparin gtt following coag profile if still unable to tolerate oral   - can resume  BB via IV route if not tolerting oral

## 2025-05-14 NOTE — H&P ADULT - PROBLEM SELECTOR PLAN 6
Fluids:s/p LR bolus   Electrolytes: replete prn   Diet: NPO   DVT PPx: eliquis   GI PPx:  PPI   Dispo: [facility]

## 2025-05-14 NOTE — H&P ADULT - PROBLEM SELECTOR PLAN 3
PT with hx of HTN per sure scripts on 5mg of amlodipine qd and 160mg valsartan   - c/w meds after confirming med rec

## 2025-05-14 NOTE — H&P ADULT - ASSESSMENT
75yF pertinent hx of Afib, cholelithiasis admitted for persistent abdominal pain/inability to tolerate PO intake pending GI evaluation

## 2025-05-14 NOTE — CONSULT NOTE ADULT - ASSESSMENT
75F PMHx Afib (on Eliquis), HTN, RA, severe OA, uterine fibroids, duodenal diverticulum, hepatic steatosis referred by patient (Landen Lopez) for evaluation of ongoing abdominal pain, inability to tolerate PO, nausea/vomiting, chronic constipation, and 20+ lb weight loss over the past few months.     EGD - doesnt recall  1 month ago, stool based test, doesn't know result for this, Dr anthony ([facility], [address])  No prior colonoscopy      Recommendations:  -Trial Mag citrate stat to see if she will tolerate prep later today given N/V  -Please order CEA on AM labs  -Please make NPO at midnight in anticipation of procedure tomorrow  -Clear liquid diet today  -Golytely prep start at 5pm, drink 4 oz every 15 minutes until complete or until stool clear, watery  -If patient unable to tolerate golytely, but tolerated Mag citrate, change to 2x bottles of mag citrate tonight with miralax  -PT/INR on AM labs  -Hold AC, hold dvt ppx morning of procedure  -Supportive care per primary team  -Plan for EGD/Colonoscopy tomorrow    -If she is unable to tolerate Prep, will plan for EGD tomorrow and will defer colon as an outpatient pending work up and results.     Patient seen and discussed with GI Attending on Rounds Dr. Maria De Jesus Victoria DO, PhD  Gastroenterology Fellow  GI Consult Weekday 7am-5pm Pager: [phone]  Weeknights/Weekend/Holiday Coverage: Please Call the  for contact info     75F PMHx Afib (on Eliquis), HTN, RA, severe OA, uterine fibroids, duodenal diverticulum, hepatic steatosis referred by patient (Landen Lopez) for evaluation of ongoing abdominal pain, inability to tolerate PO, nausea/vomiting, chronic constipation, and 20+ lb weight loss over the past few months.     EGD - doesnt recall  1 month ago, stool based test, doesn't know result for this, Dr anthony ([facility], [address])  No prior colonoscopy      Recommendations:  -Trial Mag citrate stat to see if she will tolerate prep later today given N/V  -Please order CEA on AM labs  -Please have documented risk assessment that patient is optimized for procedure and needs no further work up before being safe to proceed.   -Please make NPO at midnight in anticipation of procedure tomorrow  -Clear liquid diet today  -Golytely prep start at 5pm, drink 4 oz every 15 minutes until complete or until stool clear, watery  -If patient unable to tolerate golytely, but tolerated Mag citrate, change to 2x bottles of mag citrate tonight with miralax  -PT/INR on AM labs  -Hold AC, hold dvt ppx morning of procedure  -Supportive care per primary team  -Plan for EGD/Colonoscopy tomorrow    -If she is unable to tolerate Prep, will plan for EGD tomorrow and will defer colon as an outpatient pending work up and results.     Patient seen and discussed with GI Attending on Rounds Dr. Maria De Jesus Victoria DO, PhD  Gastroenterology Fellow  GI Consult Weekday 7am-5pm Pager: [phone]  Weeknights/Weekend/Holiday Coverage: Please Call the  for contact info

## 2025-05-14 NOTE — ED PROVIDER NOTE - PHYSICAL EXAMINATION
CONST: nontoxic NAD speaking in full sentences  HEAD: atraumatic  EYES: conjunctivae clear  NECK: supple  CARD: regular rate  CHEST: breathing comfortably, no stridor/retractions/tripoding, clear BS  ABD: soft nontender nondistended  EXT: FROM  SKIN: warm, dry  NEURO: awake alert answering questions following commands moving all extremities ambulating w/ steady gait w/ daughter assistance

## 2025-05-14 NOTE — PATIENT PROFILE ADULT - HAVE YOU EXPERIENCED VIOLENCE OR A TRAUMATIC EVENT?
no [Normal] : patient has a normal gait [Attention Intact] : attention intact [Fidgets] : does not fidget [Well-behaved during visit] : well-behaved during visit [Appropriate eye contact] : appropriate eye contact [Smiles responsively] : smiles responsively [Positive mood] : positive mood [Answered questions appropriately] : answered questions appropriately [Responds to name] : responds to name [Echolalia] : no echolalia [Social referencing noted] : social referencing noted [de-identified] : Max makes appropriate back and forth fluent conversation,

## 2025-05-14 NOTE — PATIENT PROFILE ADULT - FALL HARM RISK - HARM RISK INTERVENTIONS

## 2025-05-15 ENCOUNTER — RESULT REVIEW (OUTPATIENT)
Age: 75
End: 2025-05-15

## 2025-05-15 LAB
A1C WITH ESTIMATED AVERAGE GLUCOSE RESULT: 6.6 % — HIGH (ref 4–5.6)
ALBUMIN SERPL ELPH-MCNC: 4.1 G/DL — SIGNIFICANT CHANGE UP (ref 3.3–5)
ALP SERPL-CCNC: 73 U/L — SIGNIFICANT CHANGE UP (ref 40–120)
ALT FLD-CCNC: 17 U/L — SIGNIFICANT CHANGE UP (ref 10–45)
ANION GAP SERPL CALC-SCNC: 17 MMOL/L — SIGNIFICANT CHANGE UP (ref 5–17)
AST SERPL-CCNC: 25 U/L — SIGNIFICANT CHANGE UP (ref 10–40)
BILIRUB SERPL-MCNC: 0.8 MG/DL — SIGNIFICANT CHANGE UP (ref 0.2–1.2)
BUN SERPL-MCNC: 11 MG/DL — SIGNIFICANT CHANGE UP (ref 7–23)
CALCIUM SERPL-MCNC: 9.6 MG/DL — SIGNIFICANT CHANGE UP (ref 8.4–10.5)
CEA SERPL-MCNC: 1.3 NG/ML — SIGNIFICANT CHANGE UP (ref 0–3.8)
CHLORIDE SERPL-SCNC: 97 MMOL/L — SIGNIFICANT CHANGE UP (ref 96–108)
CO2 SERPL-SCNC: 20 MMOL/L — LOW (ref 22–31)
CREAT SERPL-MCNC: 0.81 MG/DL — SIGNIFICANT CHANGE UP (ref 0.5–1.3)
EGFR: 76 ML/MIN/1.73M2 — SIGNIFICANT CHANGE UP
EGFR: 76 ML/MIN/1.73M2 — SIGNIFICANT CHANGE UP
ESTIMATED AVERAGE GLUCOSE: 143 MG/DL — HIGH (ref 68–114)
GLUCOSE SERPL-MCNC: 75 MG/DL — SIGNIFICANT CHANGE UP (ref 70–99)
HCT VFR BLD CALC: 45.4 % — HIGH (ref 34.5–45)
HGB BLD-MCNC: 15 G/DL — SIGNIFICANT CHANGE UP (ref 11.5–15.5)
MAGNESIUM SERPL-MCNC: 2.3 MG/DL — SIGNIFICANT CHANGE UP (ref 1.6–2.6)
MCHC RBC-ENTMCNC: 29.1 PG — SIGNIFICANT CHANGE UP (ref 27–34)
MCHC RBC-ENTMCNC: 33 G/DL — SIGNIFICANT CHANGE UP (ref 32–36)
MCV RBC AUTO: 88 FL — SIGNIFICANT CHANGE UP (ref 80–100)
NRBC BLD AUTO-RTO: 0 /100 WBCS — SIGNIFICANT CHANGE UP (ref 0–0)
PHOSPHATE SERPL-MCNC: 4.1 MG/DL — SIGNIFICANT CHANGE UP (ref 2.5–4.5)
PLATELET # BLD AUTO: 168 K/UL — SIGNIFICANT CHANGE UP (ref 150–400)
POTASSIUM SERPL-MCNC: 3.9 MMOL/L — SIGNIFICANT CHANGE UP (ref 3.5–5.3)
POTASSIUM SERPL-SCNC: 3.9 MMOL/L — SIGNIFICANT CHANGE UP (ref 3.5–5.3)
PROT SERPL-MCNC: 6.8 G/DL — SIGNIFICANT CHANGE UP (ref 6–8.3)
RBC # BLD: 5.16 M/UL — SIGNIFICANT CHANGE UP (ref 3.8–5.2)
RBC # FLD: 14.4 % — SIGNIFICANT CHANGE UP (ref 10.3–14.5)
SODIUM SERPL-SCNC: 134 MMOL/L — LOW (ref 135–145)
WBC # BLD: 10.82 K/UL — HIGH (ref 3.8–10.5)
WBC # FLD AUTO: 10.82 K/UL — HIGH (ref 3.8–10.5)

## 2025-05-15 PROCEDURE — 93306 TTE W/DOPPLER COMPLETE: CPT | Mod: 26

## 2025-05-15 PROCEDURE — 93970 EXTREMITY STUDY: CPT | Mod: 26

## 2025-05-15 PROCEDURE — 99232 SBSQ HOSP IP/OBS MODERATE 35: CPT

## 2025-05-15 PROCEDURE — 99233 SBSQ HOSP IP/OBS HIGH 50: CPT | Mod: GC

## 2025-05-15 RX ORDER — MAGNESIUM CITRATE
296 SOLUTION, ORAL ORAL ONCE
Refills: 0 | Status: COMPLETED | OUTPATIENT
Start: 2025-05-15 | End: 2025-05-15

## 2025-05-15 RX ADMIN — Medication 20 MILLIGRAM(S): at 09:22

## 2025-05-15 RX ADMIN — AMLODIPINE BESYLATE 5 MILLIGRAM(S): 10 TABLET ORAL at 13:31

## 2025-05-15 RX ADMIN — METOPROLOL SUCCINATE 25 MILLIGRAM(S): 50 TABLET, EXTENDED RELEASE ORAL at 06:15

## 2025-05-15 RX ADMIN — Medication 296 MILLILITER(S): at 14:13

## 2025-05-15 RX ADMIN — Medication 40 MILLIGRAM(S): at 18:04

## 2025-05-15 NOTE — PROGRESS NOTE ADULT - PROBLEM SELECTOR PLAN 1
Patient with 20lb weight loss since onset of abdominal pain. Unable to tolerate PO intake at this time including medications at this time. GI recommended urgent inpatient evaluation. Noted to have several weeks of abdominal pain, N/V/ constipation. Recent outside non-contrast CT imaging with e/o cholelithiasis but otherwise unrevealing. CT without evidence of obstruction  - f/u GI recs   - NPO until after EG/Colonoscopy  - Zofran prn for nausea   - tylenols  650mg prn for moderate pain

## 2025-05-15 NOTE — PROGRESS NOTE ADULT - PROBLEM SELECTOR PLAN 5
Fluids: s/p 1L LR bolus   Electrolytes: replete prn   Diet: NPO except medications, until after egd colonoscopy  DVT PPx: previously on eliquis   GI PPx: PPI   Dispo: [facility]

## 2025-05-15 NOTE — PROGRESS NOTE ADULT - PROBLEM SELECTOR PLAN 3
PT with hx of HTN per sure scripts on 5mg of amlodipine qd and 160mg valsartan per medications at bedside  - started amlodipine 5mg q24h  - c/w metoprolol 25mg qd XR

## 2025-05-15 NOTE — PROGRESS NOTE ADULT - SUBJECTIVE AND OBJECTIVE BOX
Patient is a 75y old  Female who presents with a chief complaint of Inability to tolerate PO/ Gastric Pain (15 May 2025 06:00)      OVERNIGHT EVENTS: cards recommended echo ordered this morning    SUBJECTIVE: patient seen and examined at the bedside this morning. says that she only drank half the prep states that she has clear bowel movements she has no nausea no vomiting this morning. It is better improved while he’s been prepping. Otherwise it’s feeling OK. Continues to have cramping in her lower right and left quadrants. She also reports a new swelling and pain in her right lower extremity.    ROS: otherwise negative      T(C): 36.4 (05-15-25 @ 05:24), Max: 37.2 (05-14-25 @ 16:12)  HR: 90 (05-15-25 @ 05:24) (66 - 90)  BP: 110/76 (05-15-25 @ 05:24) (105/69 - 164/77)  RR: 18 (05-15-25 @ 05:24) (18 - 18)  SpO2: 97% (05-15-25 @ 05:24) (95% - 97%)  Wt(kg): --Vital Signs Last 24 Hrs  T(C): 36.4 (15 May 2025 05:24), Max: 37.2 (14 May 2025 16:12)  T(F): 97.5 (15 May 2025 05:24), Max: 98.9 (14 May 2025 16:12)  HR: 90 (15 May 2025 05:24) (66 - 90)  BP: 110/76 (15 May 2025 05:24) (105/69 - 164/77)  BP(mean): 106 (14 May 2025 16:12) (106 - 106)  RR: 18 (15 May 2025 05:24) (18 - 18)  SpO2: 97% (15 May 2025 05:24) (95% - 97%)    Parameters below as of 15 May 2025 05:24  Patient On (Oxygen Delivery Method): room air        PHYSICAL EXAM:  Constitutional: resting comfortably in bed; NAD  Head: NC/AT  Eyes: PERRL, EOMI, anicteric sclera  ENT: no nasal discharge; MMM  Neck: supple; no JVD or thyromegaly  Respiratory: CTA B/L; no W/R/R, no retractions  Cardiac: +S1/S2; RRR; no M/R/G  Gastrointestinal: soft, NT/ND; no rebound or guarding; +BSx4  Back: spine midline, no bony tenderness or step-offs; no CVAT B/L  Extremities: WWP, no clubbing or cyanosis; no peripheral edema. Capillary refill <2 sec  Musculoskeletal: NROM x4; no joint swelling, tenderness or erythema  Vascular: 2+ radial, DP/PT pulses B/L  Dermatologic: skin warm, dry and intact; no rashes, wounds, or scars  Lymphatic: no submandibular or cervical LAD  Neurologic: AAOx3; CNII-XII grossly intact; no focal deficits  Psychiatric: affect and characteristics of appearance, verbalizations, behaviors are appropriate    LABS:                        15.0   10.82 )-----------( 168      ( 15 May 2025 08:51 )             45.4     05-15    134[L]  |  97  |  11  ----------------------------<  75  3.9   |  20[L]  |  0.81    Ca    9.6      15 May 2025 08:51  Phos  4.1     05-15  Mg     2.3     05-15    TPro  6.8  /  Alb  4.1  /  TBili  0.8  /  DBili  x   /  AST  25  /  ALT  17  /  AlkPhos  73  05-15        Urinalysis Basic - ( 15 May 2025 08:51 )    Color: x / Appearance: x / SG: x / pH: x  Gluc: 75 mg/dL / Ketone: x  / Bili: x / Urobili: x   Blood: x / Protein: x / Nitrite: x   Leuk Esterase: x / RBC: x / WBC x   Sq Epi: x / Non Sq Epi: x / Bacteria: x      CAPILLARY BLOOD GLUCOSE            Urinalysis Basic - ( 15 May 2025 08:51 )    Color: x / Appearance: x / SG: x / pH: x  Gluc: 75 mg/dL / Ketone: x  / Bili: x / Urobili: x   Blood: x / Protein: x / Nitrite: x   Leuk Esterase: x / RBC: x / WBC x   Sq Epi: x / Non Sq Epi: x / Bacteria: x        MEDICATIONS  (STANDING):  amLODIPine   Tablet 5 milliGRAM(s) Oral every 24 hours  famotidine Injectable 20 milliGRAM(s) IV Push every 24 hours  magnesium citrate Oral Solution 296 milliLiter(s) Oral once  metoprolol succinate ER 25 milliGRAM(s) Oral daily  multivitamin 1 Tablet(s) Oral every 24 hours  pantoprazole  Injectable 40 milliGRAM(s) IV Push every 24 hours  thiamine 100 milliGRAM(s) Oral every 24 hours    MEDICATIONS  (PRN):  acetaminophen     Tablet .. 650 milliGRAM(s) Oral every 6 hours PRN Temp greater or equal to 38C (100.4F), Mild Pain (1 - 3)  aluminum hydroxide/magnesium hydroxide/simethicone Suspension 30 milliLiter(s) Oral every 4 hours PRN Dyspepsia  melatonin 3 milliGRAM(s) Oral at bedtime PRN Insomnia  ondansetron Injectable 4 milliGRAM(s) IV Push every 8 hours PRN Nausea and/or Vomiting      RADIOLOGY & ADDITIONAL TESTS: Reviewed   Patient is a 75y old  Female who presents with a chief complaint of Inability to tolerate PO/ Gastric Pain (15 May 2025 06:00)    OVERNIGHT EVENTS: cards recommended echo ordered this morning    SUBJECTIVE: patient seen and examined at the bedside this morning. says that she only drank half the prep states that she has clear bowel movements she has no nausea no vomiting this morning. It is better improved while he’s been prepping. Otherwise it’s feeling OK. Continues to have cramping in her lower right and left quadrants. She also reports a new swelling and pain in her right lower extremity.    ROS: otherwise negative      T(C): 36.4 (05-15-25 @ 05:24), Max: 37.2 (05-14-25 @ 16:12)  HR: 90 (05-15-25 @ 05:24) (66 - 90)  BP: 110/76 (05-15-25 @ 05:24) (105/69 - 164/77)  RR: 18 (05-15-25 @ 05:24) (18 - 18)  SpO2: 97% (05-15-25 @ 05:24) (95% - 97%)  Wt(kg): --Vital Signs Last 24 Hrs  T(C): 36.4 (15 May 2025 05:24), Max: 37.2 (14 May 2025 16:12)  T(F): 97.5 (15 May 2025 05:24), Max: 98.9 (14 May 2025 16:12)  HR: 90 (15 May 2025 05:24) (66 - 90)  BP: 110/76 (15 May 2025 05:24) (105/69 - 164/77)  BP(mean): 106 (14 May 2025 16:12) (106 - 106)  RR: 18 (15 May 2025 05:24) (18 - 18)  SpO2: 97% (15 May 2025 05:24) (95% - 97%)    Parameters below as of 15 May 2025 05:24  Patient On (Oxygen Delivery Method): room air        PHYSICAL EXAM:  Constitutional: resting comfortably in bed; NAD  Eyes: PERRL, EOMI  ENT: no nasal discharge; MMM  Neck: supple; no JVD or thyromegaly  Respiratory: CTA B/L; no W/R/R, no retractions  Cardiac: +S1/S2; RRR; no M/R/G  Gastrointestinal: soft, epigastric tenderness, nondistended; no rebound or guarding; +BSx4  Extremities: WWP, no clubbing or cyanosis; no peripheral edema. varicose veins bilaterally. Capillary refill <2 sec  Musculoskeletal: NROM x4; no joint swelling, tenderness or erythema  Vascular: 2+ radial, DP/PT pulses B/L  Dermatologic: skin warm, dry and intact; no rashes, wounds, or scars  Neurologic: AAOx3  Psychiatric: tangential with conversation.       LABS:                        15.0   10.82 )-----------( 168      ( 15 May 2025 08:51 )             45.4     05-15    134[L]  |  97  |  11  ----------------------------<  75  3.9   |  20[L]  |  0.81    Ca    9.6      15 May 2025 08:51  Phos  4.1     05-15  Mg     2.3     05-15    TPro  6.8  /  Alb  4.1  /  TBili  0.8  /  DBili  x   /  AST  25  /  ALT  17  /  AlkPhos  73  05-15        Urinalysis Basic - ( 15 May 2025 08:51 )    Color: x / Appearance: x / SG: x / pH: x  Gluc: 75 mg/dL / Ketone: x  / Bili: x / Urobili: x   Blood: x / Protein: x / Nitrite: x   Leuk Esterase: x / RBC: x / WBC x   Sq Epi: x / Non Sq Epi: x / Bacteria: x      CAPILLARY BLOOD GLUCOSE            Urinalysis Basic - ( 15 May 2025 08:51 )    Color: x / Appearance: x / SG: x / pH: x  Gluc: 75 mg/dL / Ketone: x  / Bili: x / Urobili: x   Blood: x / Protein: x / Nitrite: x   Leuk Esterase: x / RBC: x / WBC x   Sq Epi: x / Non Sq Epi: x / Bacteria: x        MEDICATIONS  (STANDING):  amLODIPine   Tablet 5 milliGRAM(s) Oral every 24 hours  famotidine Injectable 20 milliGRAM(s) IV Push every 24 hours  magnesium citrate Oral Solution 296 milliLiter(s) Oral once  metoprolol succinate ER 25 milliGRAM(s) Oral daily  multivitamin 1 Tablet(s) Oral every 24 hours  pantoprazole  Injectable 40 milliGRAM(s) IV Push every 24 hours  thiamine 100 milliGRAM(s) Oral every 24 hours    MEDICATIONS  (PRN):  acetaminophen     Tablet .. 650 milliGRAM(s) Oral every 6 hours PRN Temp greater or equal to 38C (100.4F), Mild Pain (1 - 3)  aluminum hydroxide/magnesium hydroxide/simethicone Suspension 30 milliLiter(s) Oral every 4 hours PRN Dyspepsia  melatonin 3 milliGRAM(s) Oral at bedtime PRN Insomnia  ondansetron Injectable 4 milliGRAM(s) IV Push every 8 hours PRN Nausea and/or Vomiting      RADIOLOGY & ADDITIONAL TESTS: Reviewed

## 2025-05-15 NOTE — PROGRESS NOTE ADULT - PROBLEM SELECTOR PLAN 4
Pt w hx of newly diagnosed afib, started on Eliquis and metoprolol 25mg ER   - has been unable to tolerate Eliquis/BB for the last week, as such can start heparin gtt following coag profile if still unable to tolerate oral   - c/w metoprolol 25mg qd XR  - off eliquis pending procedure

## 2025-05-15 NOTE — PROGRESS NOTE ADULT - SUBJECTIVE AND OBJECTIVE BOX
INTERVAL EVENTS:    Cardiac medications administered in the last 48h:  amLODIPine   Tablet: 5 milliGRAM(s) Oral (05-15-25 @ 13:31)  metoprolol succinate ER: 25 milliGRAM(s) Oral (05-15-25 @ 06:15)  amLODIPine   Tablet: 5 milliGRAM(s) Oral (05-14-25 @ 14:03)  metoprolol succinate ER: 25 milliGRAM(s) Oral (05-14-25 @ 07:17)      MEDICATIONS  (STANDING):  amLODIPine   Tablet 5 milliGRAM(s) Oral every 24 hours  famotidine Injectable 20 milliGRAM(s) IV Push every 24 hours  metoprolol succinate ER 25 milliGRAM(s) Oral daily  multivitamin 1 Tablet(s) Oral every 24 hours  pantoprazole  Injectable 40 milliGRAM(s) IV Push every 24 hours  thiamine 100 milliGRAM(s) Oral every 24 hours    MEDICATIONS  (PRN):  acetaminophen     Tablet .. 650 milliGRAM(s) Oral every 6 hours PRN Temp greater or equal to 38C (100.4F), Mild Pain (1 - 3)  aluminum hydroxide/magnesium hydroxide/simethicone Suspension 30 milliLiter(s) Oral every 4 hours PRN Dyspepsia  melatonin 3 milliGRAM(s) Oral at bedtime PRN Insomnia  ondansetron Injectable 4 milliGRAM(s) IV Push every 8 hours PRN Nausea and/or Vomiting      VITALS 24H  T(F): 97.5 (05-15-25 @ 05:24), Max: 97.6 (05-14-25 @ 21:22)  HR: 90 (05-15-25 @ 05:24) (70 - 90)  BP: 110/76 (05-15-25 @ 05:24) (105/69 - 110/76)  BP(mean): --  ABP: --  ABP(mean): --  RR: 18 (05-15-25 @ 05:24) (18 - 18)  SpO2: 97% (05-15-25 @ 05:24) (95% - 97%)  CVP(mm Hg): --    I/O Detail 24H      PHYSICAL EXAM:  GEN: NAD  HEENT: EOMI   RESP: CTA b/l  CV: RRR. Normal S1/S2. No m/r/g.  ABD: soft, non-distended  EXT: No edema   NEURO: alert and attentive    LABS:  CBC 05-15-25 @ 08:51                        15.0   10.82 )-----------( 168                   45.4     Hgb trend: 15.0 <-- , 14.5 <--   WBC trend: 10.82 <-- , 7.69 <--       CMP 05-15-25 @ 08:51    134[L]  |  97  |  11  ----------------------------<  75  3.9   |  20[L]  |  0.81    Ca    9.6      05-15-25 @ 08:51  Phos  4.1     05-15  Mg     2.3     05-15    TPro  6.8  /  Alb  4.1  /  TBili  0.8  /  DBili  x   /  AST  25  /  ALT  17  /  AlkPhos  73     05-15    Serum Cr (eGFR) trend: 0.81 (76) <-- , 0.93 (64) <--           Cardiac Markers   05-13-25 @ 22:14: HS Trop T 8     / CK x     / CKMB x

## 2025-05-15 NOTE — PROGRESS NOTE ADULT - PROBLEM SELECTOR PLAN 2
Patient with several weeks of abdominal pain (See H+P) . Prior imaging significant for cholelithiasis   per home meds on omeprazole qd. Had been told she had gastritis and ulcers but no EGD performed.   Per Dr. Hallman: patient with no prior EGD or colonoscopy evaluation, warrants endoscopic evaluation to further evaluate symptoms however given inability to tolerate PO and ongoing symptoms, recommended expedited inpatient evaluation. RUQUS: Cholelithiasis without evidence of cholecystitis.  - was recommended for the following: CT A/P w/ IV/PO contrast including repeat BW including lipase, CBC, CMP   - c/w IV PPI 40mg q24h  - c/w famotidine 20mg IV P  - Will work to obtain [facility] and [facility] records for review  - pending EGD colonscopy, underwent prep

## 2025-05-15 NOTE — PROGRESS NOTE ADULT - ATTENDING COMMENTS
Ptn still w epigastric discomfort, on exam, abd distended habitus but soft, mildly tender to epigastric palpation.  - TTE w EF 71%, no RWMA, cards cleared, holding eliquis without need for heparin bridge, planning for EGD by GI today  - medically ready after

## 2025-05-16 ENCOUNTER — TRANSCRIPTION ENCOUNTER (OUTPATIENT)
Age: 75
End: 2025-05-16

## 2025-05-16 VITALS
DIASTOLIC BLOOD PRESSURE: 94 MMHG | OXYGEN SATURATION: 95 % | SYSTOLIC BLOOD PRESSURE: 147 MMHG | HEART RATE: 68 BPM | RESPIRATION RATE: 18 BRPM | TEMPERATURE: 99 F

## 2025-05-16 PROCEDURE — 82378 CARCINOEMBRYONIC ANTIGEN: CPT

## 2025-05-16 PROCEDURE — 96374 THER/PROPH/DIAG INJ IV PUSH: CPT

## 2025-05-16 PROCEDURE — 83690 ASSAY OF LIPASE: CPT

## 2025-05-16 PROCEDURE — 84100 ASSAY OF PHOSPHORUS: CPT

## 2025-05-16 PROCEDURE — 84484 ASSAY OF TROPONIN QUANT: CPT

## 2025-05-16 PROCEDURE — 36415 COLL VENOUS BLD VENIPUNCTURE: CPT

## 2025-05-16 PROCEDURE — 43239 EGD BIOPSY SINGLE/MULTIPLE: CPT | Mod: GC

## 2025-05-16 PROCEDURE — 96375 TX/PRO/DX INJ NEW DRUG ADDON: CPT

## 2025-05-16 PROCEDURE — 85027 COMPLETE CBC AUTOMATED: CPT

## 2025-05-16 PROCEDURE — 88305 TISSUE EXAM BY PATHOLOGIST: CPT

## 2025-05-16 PROCEDURE — 99285 EMERGENCY DEPT VISIT HI MDM: CPT

## 2025-05-16 PROCEDURE — 83880 ASSAY OF NATRIURETIC PEPTIDE: CPT

## 2025-05-16 PROCEDURE — 93970 EXTREMITY STUDY: CPT

## 2025-05-16 PROCEDURE — 80053 COMPREHEN METABOLIC PANEL: CPT

## 2025-05-16 PROCEDURE — 88305 TISSUE EXAM BY PATHOLOGIST: CPT | Mod: 26

## 2025-05-16 PROCEDURE — 76705 ECHO EXAM OF ABDOMEN: CPT

## 2025-05-16 PROCEDURE — 83036 HEMOGLOBIN GLYCOSYLATED A1C: CPT

## 2025-05-16 PROCEDURE — 93005 ELECTROCARDIOGRAM TRACING: CPT

## 2025-05-16 PROCEDURE — C8929: CPT

## 2025-05-16 PROCEDURE — 83735 ASSAY OF MAGNESIUM: CPT

## 2025-05-16 PROCEDURE — 99239 HOSP IP/OBS DSCHRG MGMT >30: CPT | Mod: GC

## 2025-05-16 PROCEDURE — 96376 TX/PRO/DX INJ SAME DRUG ADON: CPT

## 2025-05-16 PROCEDURE — 74177 CT ABD & PELVIS W/CONTRAST: CPT

## 2025-05-16 PROCEDURE — 81003 URINALYSIS AUTO W/O SCOPE: CPT

## 2025-05-16 PROCEDURE — 85025 COMPLETE CBC W/AUTO DIFF WBC: CPT

## 2025-05-16 PROCEDURE — 71045 X-RAY EXAM CHEST 1 VIEW: CPT

## 2025-05-16 RX ORDER — METOPROLOL SUCCINATE 50 MG/1
1 TABLET, EXTENDED RELEASE ORAL
Qty: 0 | Refills: 0 | DISCHARGE
Start: 2025-05-16

## 2025-05-16 RX ORDER — B1/B2/B3/B5/B6/B12/VIT C/FOLIC 500-0.5 MG
1 TABLET ORAL
Qty: 0 | Refills: 0 | DISCHARGE
Start: 2025-05-16

## 2025-05-16 RX ORDER — SIMETHICONE 80 MG
40 TABLET,CHEWABLE ORAL EVERY 6 HOURS
Refills: 0 | Status: DISCONTINUED | OUTPATIENT
Start: 2025-05-16 | End: 2025-05-16

## 2025-05-16 RX ORDER — AMLODIPINE BESYLATE 10 MG/1
1 TABLET ORAL
Qty: 0 | Refills: 0 | DISCHARGE
Start: 2025-05-16

## 2025-05-16 RX ORDER — METOPROLOL SUCCINATE 50 MG/1
0 TABLET, EXTENDED RELEASE ORAL
Qty: 0 | Refills: 2 | DISCHARGE

## 2025-05-16 RX ORDER — OMEPRAZOLE 20 MG/1
0 CAPSULE, DELAYED RELEASE ORAL
Qty: 0 | Refills: 0 | DISCHARGE

## 2025-05-16 RX ORDER — AMLODIPINE BESYLATE 10 MG/1
0 TABLET ORAL
Qty: 0 | Refills: 0 | DISCHARGE

## 2025-05-16 RX ADMIN — Medication 20 MILLIGRAM(S): at 11:36

## 2025-05-16 RX ADMIN — Medication 40 MILLIGRAM(S): at 13:09

## 2025-05-16 RX ADMIN — Medication 4 MILLIGRAM(S): at 14:29

## 2025-05-16 RX ADMIN — METOPROLOL SUCCINATE 25 MILLIGRAM(S): 50 TABLET, EXTENDED RELEASE ORAL at 06:35

## 2025-05-16 RX ADMIN — Medication 40 MILLIGRAM(S): at 15:56

## 2025-05-16 NOTE — DISCHARGE NOTE PROVIDER - HOSPITAL COURSE
#Discharge: do not delete    Hospital course (by problem):   75yF pertinent hx of Afib (LD eliquis on 5/13), cholelithiasis admitted for persistent abdominal pain/inability to tolerate PO intake pending GI evaluation differential including peptic ulcer disease, vs malignancy, structural component (intraluminal vs extraluminal), neurological component. Patient was planned for EGD and colonoscopy on 5/15 however per GI patient was no on AC prior, and required cardiac clearance prior to procedure, rescheduled for EGD on 5/16 in am. Patient noting her symptoms are improving. Patient underwent preoperative exam intermediate risk for low risk procedure per cardiology. Pending EGD and further GI/ Cards recommendations.      #Adult failure to thrive.   Patient with 20lb weight loss since onset of abdominal pain. Unable to tolerate PO intake at this time including medications at this time. GI recommended urgent inpatient evaluation. Noted to have several weeks of abdominal pain, N/V/ constipation. Recent outside non-contrast CT imaging with e/o cholelithiasis but otherwise unrevealing. CT without evidence of obstruction  -EGD and colonoscopy findings as below  - tylenols  650mg prn for moderate pain.    #Abdominal pain.   Patient with several weeks of abdominal pain (See H+P) . Prior imaging significant for cholelithiasis   per home meds on omeprazole qd. Had been told she had gastritis and ulcers but no EGD performed.   Per Dr. Hallman: patient with no prior EGD or colonoscopy evaluation, warrants endoscopic evaluation to further evaluate symptoms however given inability to tolerate PO and ongoing symptoms, recommended expedited inpatient evaluation. RUQUS: Cholelithiasis without evidence of cholecystitis.  - c/w PO PPI 40mg q24h  - c/w famotidine 20mg PO q24h   - EGD showing:  - outpatient colonoscopy with Dr. Hallman    #HTN (hypertension).   PT with hx of HTN per sure scripts on 5mg of amlodipine qd and 160mg valsartan per medications at bedside  - c/w amlodipine 5mg q24h  - c/w metoprolol 25mg qd XR.    #Afib.   Pt w hx of newly diagnosed afib, started on Eliquis and metoprolol 25mg ER. has been of elliquis prior to EGD and Colonoscopy  - c/w metoprolol 25mg qd XR  - plan to resume AC after EGD/colonoscopy    Patient was discharged to: HOME    New medications: PPI PO q24h   Changes to old medications: na  Medications that were stopped: na    Items to follow up as outpatient: PCP and GI    Physical exam at the time of discharge:  Constitutional: resting comfortably in bed; NAD  Eyes: PERRL, EOMI  ENT: no nasal discharge; MMM  Neck: supple; no JVD or thyromegaly  Respiratory: CTA B/L; no W/R/R, no retractions  Cardiac: +S1/S2; RRR; no M/R/G  Gastrointestinal: soft, epigastric tenderness, nondistended; no rebound or guarding; +BSx4  Extremities: WWP, no clubbing or cyanosis; no peripheral edema. varicose veins bilaterally. Capillary refill <2 sec  Musculoskeletal: NROM x4; no joint swelling, tenderness or erythema  Vascular: 2+ radial, DP/PT pulses B/L  Dermatologic: skin warm, dry and intact; no rashes, wounds, or scars  Neurologic: AAOx3  Psychiatric: tangential with conversation.    #Discharge: do not delete    Hospital course (by problem):   75yF pertinent hx of Afib (LD eliquis on 5/13), cholelithiasis admitted for persistent abdominal pain/inability to tolerate PO intake pending GI evaluation differential including peptic ulcer disease, vs malignancy, structural component (intraluminal vs extraluminal), neurological component. Patient was planned for EGD and colonoscopy on 5/15 however per GI patient was no on AC prior, and required cardiac clearance prior to procedure, rescheduled for EGD on 5/16 in am. Patient noting her symptoms are improving. Patient underwent preoperative exam intermediate risk for low risk procedure per cardiology. Pending EGD and further GI/ Cards recommendations.      #Adult failure to thrive.   Patient with 20lb weight loss since onset of abdominal pain. Unable to tolerate PO intake at this time including medications at this time. GI recommended urgent inpatient evaluation. Noted to have several weeks of abdominal pain, N/V/ constipation. Recent outside non-contrast CT imaging with e/o cholelithiasis but otherwise unrevealing. CT without evidence of obstruction  -EGD and colonoscopy findings as below  - tylenols  650mg prn for moderate pain.    #Abdominal pain.   Patient with several weeks of abdominal pain (See H+P) . Prior imaging significant for cholelithiasis   per home meds on omeprazole qd. Had been told she had gastritis and ulcers but no EGD performed.   Per Dr. Hallman: patient with no prior EGD or colonoscopy evaluation, warrants endoscopic evaluation to further evaluate symptoms however given inability to tolerate PO and ongoing symptoms, recommended expedited inpatient evaluation. RUQUS: Cholelithiasis without evidence of cholecystitis.  5/16: EGD showing: A single non-bleeding diverticulum with a large opening was seen in the Second part of duodenum, normal mucosa was noted in the whole esophagus., diffuse erythema of the mucosa with no bleeding was noted in the stomach. These findings are compatible with non-erosive gastritis. Multiple cold forceps random only biopsies were performed for H. pylori in the antrum and stomach body, A few sessile non-bleeding polyps of benign appearance were found in the fundus and stomach body. Findings were suggestive of fundic gland-type polyp(s)., A single localized nodule of benign appearance was seen in the pre-pyloric region. This finding was suggestive of a submucosal nodule/tumor. Multiple cold forceps random only biopsies were performed in the pre-pyloric region.  - c/w PO PPI 40mg q24h  - c/w famotidine 20mg PO q24h   - Low Fiber/Residue Diet  - Advance diet as tolerated  follow up pathology with Gastroenterology   - Will discuss with advanced endoscopists for EGD/EUS in the future  - Prioritize outpatient colonoscopy w/ 2 day prep recommended with Dr. Hallman    #HTN (hypertension).   PT with hx of HTN per sure scripts on 5mg of amlodipine qd and 160mg valsartan per medications at bedside  - c/w amlodipine 5mg q24h  - c/w metoprolol 25mg qd XR.    #Afib.   Pt w hx of newly diagnosed afib, started on Eliquis and metoprolol 25mg ER. has been of elliquis prior to EGD and Colonoscopy  - c/w metoprolol 25mg qd XR  - plan to resume AC after EGD/colonoscopy    Patient was discharged to: HOME    New medications: PPI PO q24h   Changes to old medications: na  Medications that were stopped: valsartan 160mg     Items to follow up as outpatient: PCP and GI    Physical exam at the time of discharge:  Constitutional: resting comfortably in bed; NAD  Eyes: PERRL, EOMI  ENT: no nasal discharge; MMM  Neck: supple; no JVD or thyromegaly  Respiratory: CTA B/L; no W/R/R, no retractions  Cardiac: +S1/S2; RRR; no M/R/G  Gastrointestinal: soft, epigastric tenderness, nondistended; no rebound or guarding; +BSx4  Extremities: WWP, no clubbing or cyanosis; no peripheral edema. varicose veins bilaterally. Capillary refill <2 sec  Musculoskeletal: NROM x4; no joint swelling, tenderness or erythema  Vascular: 2+ radial, DP/PT pulses B/L  Dermatologic: skin warm, dry and intact; no rashes, wounds, or scars  Neurologic: AAOx3  Psychiatric: tangential with conversation.    #Discharge: do not delete    Hospital course (by problem):   75yF pertinent hx of Afib (LD eliquis on 5/13), cholelithiasis admitted for persistent abdominal pain/inability to tolerate PO intake pending GI evaluation differential including peptic ulcer disease, vs malignancy, structural component (intraluminal vs extraluminal), neurological component. Patient was planned for EGD and colonoscopy on 5/15 however per GI patient was no on AC prior, and required cardiac clearance prior to procedure, rescheduled for EGD on 5/16 in am. Patient noting her symptoms are improving. Patient underwent preoperative exam intermediate risk for low risk procedure per cardiology, sp EGD by GI    #Adult failure to thrive.   Patient with 20lb weight loss since onset of abdominal pain. Unable to tolerate PO intake at this time including medications at this time. GI recommended urgent inpatient evaluation. Noted to have several weeks of abdominal pain, N/V/ constipation. Recent outside non-contrast CT imaging with e/o cholelithiasis but otherwise unrevealing. CT without evidence of obstruction  -EGD findings as below  - tylenols  650mg prn for moderate pain.    #Abdominal pain.   Patient with several weeks of abdominal pain (See H+P) . Prior imaging significant for cholelithiasis   per home meds on omeprazole qd. Had been told she had gastritis and ulcers but no EGD performed.   Per Dr. Hallman: patient with no prior EGD or colonoscopy evaluation, warrants endoscopic evaluation to further evaluate symptoms however given inability to tolerate PO and ongoing symptoms, recommended expedited inpatient evaluation. RUQUS: Cholelithiasis without evidence of cholecystitis.  5/16: EGD showing: A single non-bleeding diverticulum with a large opening was seen in the Second part of duodenum, normal mucosa was noted in the whole esophagus., diffuse erythema of the mucosa with no bleeding was noted in the stomach. These findings are compatible with non-erosive gastritis. Multiple cold forceps random only biopsies were performed for H. pylori in the antrum and stomach body, A few sessile non-bleeding polyps of benign appearance were found in the fundus and stomach body. Findings were suggestive of fundic gland-type polyp(s)., A single localized nodule of benign appearance was seen in the pre-pyloric region. This finding was suggestive of a submucosal nodule/tumor. Multiple cold forceps random only biopsies were performed in the pre-pyloric region.  - c/w PO PPI 40mg q24h  - c/w famotidine 20mg PO q24h   - Low Fiber/Residue Diet  - Advance diet as tolerated  follow up pathology with Gastroenterology   - Will discuss with advanced endoscopists for EGD/EUS in the future  - Prioritize outpatient colonoscopy w/ 2 day prep recommended with Dr. Hallman    #HTN (hypertension).   PT with hx of HTN per sure scripts on 5mg of amlodipine qd and 160mg valsartan per medications at bedside  - c/w amlodipine 5mg q24h  - c/w metoprolol 25mg qd XR.    #Afib.   Pt w hx of newly diagnosed afib, started on Eliquis and metoprolol 25mg ER. has been off of elliquis prior to EGD and Colonoscopy  - c/w metoprolol 25mg qd XR  - plan to resume AC after EGD/colonoscopy    Patient was discharged to: HOME    New medications: PPI PO q24h   Changes to old medications: na  Medications that were stopped: valsartan 160mg     Items to follow up as outpatient: PCP and GI    Physical exam at the time of discharge:  Constitutional: resting comfortably in bed; NAD  Eyes: PERRL, EOMI  ENT: no nasal discharge; MMM  Neck: supple; no JVD or thyromegaly  Respiratory: CTA B/L; no W/R/R, no retractions  Cardiac: +S1/S2; RRR; no M/R/G  Gastrointestinal: soft, epigastric tenderness, nondistended; no rebound or guarding; +BSx4  Extremities: WWP, no clubbing or cyanosis; no peripheral edema. varicose veins bilaterally. Capillary refill <2 sec  Musculoskeletal: NROM x4; no joint swelling, tenderness or erythema  Vascular: 2+ radial, DP/PT pulses B/L  Dermatologic: skin warm, dry and intact; no rashes, wounds, or scars  Neurologic: AAOx3  Psychiatric: tangential with conversation.

## 2025-05-16 NOTE — PRE-ANESTHESIA EVALUATION ADULT - NSRADCARDRESULTSFT_GEN_ALL_CORE
All available imaging reviewed.     Transthoracic Echocardiogram 5/15/2025  "CONCLUSIONS:     1. Left ventricular cavity is normal in size. Left ventricular wall thickness is normal. Left ventricular systolic function is normal with an ejection fraction of 71 % by Jacobs's method of disks. There are no regional wall motion abnormalities seen.   2. Normal left ventricular diastolic function, with normal left ventricular filling pressure.   3. Normal right ventricular cavity size, with normal wall thickness, and normal right ventricular systolic function.   4. Normal left and right atrial size.   5. There is mitral valve thickening of the anterior leaflet with possible prolapse. There is no mitral valve stenosis. There appears to be moderate mitral regurgitation however mitral regurgitant jet is eccentrically directed and the jet is not fully visualized.   6. No pericardial effusion seen.   7. Estimated pulmonary artery systolic pressure is 26 mmHg.   8. No prior echocardiogram is available for comparison."

## 2025-05-16 NOTE — DISCHARGE NOTE PROVIDER - PROVIDER TOKENS
FREE:[LAST:[Harjit],FIRST:[Rayshawn],PHONE:[phone]],FAX:[(   )    -],ADDRESS:[[place] [address]],SCHEDULEDAPPT:[05/19/2025],SCHEDULEDAPPTTIME:[12:00 PM]] FREE:[LAST:[Harjit],FIRST:[Rayshawn],PHONE:[phone]],FAX:[(   )    -],ADDRESS:[[place] [address]],SCHEDULEDAPPT:[05/19/2025],SCHEDULEDAPPTTIME:[12:00 PM]],PROVIDER:[TOKEN:[[id number]],SCHEDULEDAPPT:[06/18/2025],SCHEDULEDAPPTTIME:[03:30 PM]]

## 2025-05-16 NOTE — DISCHARGE NOTE PROVIDER - NSDCFUADDAPPT_GEN_ALL_CORE_FT
Please bring your Insurance card, Photo ID and Discharge paperwork to the following appointment:    (1) Please follow up with your Gastroenterology Provider, Dr. Elizabeth Hallman at [address] [place] [address], [address], [place] [address] on 6/18/2025 at 3:30pm.    Appointment was scheduled by Ms. CARMINA Connell, Referral Coordinator.

## 2025-05-16 NOTE — PRE-ANESTHESIA EVALUATION ADULT - NSANTHOSAYNRD_GEN_A_CORE
No. TIM screening performed.  STOP BANG Legend: 0-2 = LOW Risk; 3-4 = INTERMEDIATE Risk; 5-8 = HIGH Risk Opt out

## 2025-05-16 NOTE — DISCHARGE NOTE PROVIDER - ATTENDING DISCHARGE PHYSICAL EXAMINATION:
Gen: sitting upright in bed at time of exam  HEENT: NCAT, MMM, clear OP  Neck: supple, trachea at midline  CV: RRR, +S1/S2  Pulm: adequate respiratory effort, no increased work of breathing  Abd: soft, ND, mild epigastric tenderness similar/somewhat improved to prior, no rebound or guarding  Skin: warm and dry, no new rashes vs prior report  Ext: WWP  Neuro: AOx3, no gross focal neurological deficits  Psych: affect and behavior appropriate

## 2025-05-16 NOTE — PROGRESS NOTE ADULT - PROBLEM SELECTOR PLAN 3
PT with hx of HTN per sure scripts on 5mg of amlodipine qd and 160mg valsartan per medications at bedside  - c/w amlodipine 5mg q24h  - c/w metoprolol 25mg qd XR

## 2025-05-16 NOTE — PROGRESS NOTE ADULT - PROBLEM SELECTOR PLAN 2
Patient with several weeks of abdominal pain (See H+P) . Prior imaging significant for cholelithiasis   per home meds on omeprazole qd. Had been told she had gastritis and ulcers but no EGD performed.   Per Dr. Hallman: patient with no prior EGD or colonoscopy evaluation, warrants endoscopic evaluation to further evaluate symptoms however given inability to tolerate PO and ongoing symptoms, recommended expedited inpatient evaluation. RUQUS: Cholelithiasis without evidence of cholecystitis.  - was recommended for the following: CT A/P w/ IV/PO contrast including repeat BW including lipase, CBC, CMP   - c/w IV PPI 40mg q24h  - c/w famotidine 20mg IV P  - pending EGD colonoscopy, underwent prep on 5/15 Patient with several weeks of abdominal pain (See H+P) . Prior imaging significant for cholelithiasis   per home meds on omeprazole qd. Had been told she had gastritis and ulcers but no EGD performed.   Per Dr. Hallman: patient with no prior EGD or colonoscopy evaluation, warrants endoscopic evaluation to further evaluate symptoms however given inability to tolerate PO and ongoing symptoms, recommended expedited inpatient evaluation. RUQUS: Cholelithiasis without evidence of cholecystitis.  - c/w IV PPI 40mg q24h  - c/w famotidine 20mg IV P  - pending EGD colonoscopy, underwent prep on 5/15, now for 5/16

## 2025-05-16 NOTE — DISCHARGE NOTE PROVIDER - CARE PROVIDER_API CALL
Harjit [place] [address] [place] [address]  Phone: [phone]  Fax: (   )    -  Scheduled Appointment: 05/19/2025 12:00 PM   Harj[address], [place] [address]  Phone: [phone]  Fax: (   )    -  Scheduled Appointment: 05/19/2025 12:00 PM    Elizabeth Hallman  Gastroenterology  [address], [address]  [place] [address]  Phone: [phone]  Fax: [phone]  Scheduled Appointment: 06/18/2025 03:30 PM

## 2025-05-16 NOTE — PROGRESS NOTE ADULT - ASSESSMENT
75F PMH HTN, Afib (on Eliquis), RA, severe OA, uterine fibroids, duodenal diverticulum, hepatic steatosis who presented to outpatient GI this AM (Dr. Barillas) for persistent abdominal pain, N/V, weight loss and inability to tolerate PO intake, admitted to [facility] for workup and management of these symptoms. GI following, planning for EGD/colonoscopy tomorrow, 5/15. Cardiology consulted for pre-operative assessment prior to EGD/colonoscopy 5/15.    Review of studies:  TTE 5/15/25: Normal biventricular size and function. At least moderate MR. RAP 3.   ECG 5/14/25 4:43: afib, rate 72  ECG 5/14/25 19:05: afib, rate 72    Home meds (cardiac): eliquis 5mg BID, toprol 25mg qd, amlodipine 5mg qd, valsartan 160mg qd    Recommendations:    #Pre-operative CV assessment/evaluation   -patient without known history of CHF, MI, or CAD  -poor historian and unable to clarify true exertional symptoms however activity severely limited by fatigue and OA pain. Poor METS. Denies any chest pain with exertion or at rest.   -trops negative, proBNP 814  -ECG afib, non-ischemic   -on exam patient warm/well-perfused and euvolemic   -TTE without severe valve disease  -patient is intermediate risk for low risk procedure   -c/w home toprol 25mg qd (hold for SBP <100, HR <60)  -c/w home amlodipine 5mg qd (hold for SBP <100, HR <60)  -can hold home valsartan for now, however seems BPs are labile so would restart if SBPs consistently >150      #afib   -currently rate controlled  -CHADSVASC at least 3  -Given low thrombotic risk, no indication for heparin bridging prior to procedure    Preliminary recommendations pending discussion with attending cardiologist   Recommendations not final until attested by attending       
75yF pertinent hx of Afib (LD 5/13), cholelithiasis admitted for persistent abdominal pain/inability to tolerate PO intake pending GI evaluation differential including peptic ulcer disease, vs malignancy, structural component (intraluminal vs extraluminal), neurological component. 
75yF pertinent hx of Afib (LD 5/13), cholelithiasis admitted for persistent abdominal pain/inability to tolerate PO intake pending GI evaluation differential including peptic ulcer disease, vs malignancy, structural component (intraluminal vs extraluminal), neurological component. 
75yF pertinent hx of Afib (LD 5/13), cholelithiasis admitted for persistent abdominal pain/inability to tolerate PO intake pending GI evaluation differential including peptic ulcer disease, vs malignancy, structural component (intraluminal vs extraluminal), neurological component. Pending EGD and colonoscopy with GI for further evaluation of sx. Cards consulted for preoperative clearance.

## 2025-05-16 NOTE — CHART NOTE - NSCHARTNOTEFT_GEN_A_CORE
GI Chart Note    Upper Endoscopy Report    EGD  5/16/25    Findings:  -A single non-bleeding diverticulum with a large opening was seen in the Second part of duodenum.  -Normal mucosa was noted in the whole esophagus.  -Diffuse erythema of the mucosa with no bleeding was noted in the stomach. These findings are compatible with non-erosive gastritis. Multiple cold forceps random only biopsies were performed for H. pylori in the antrum and stomach body.  -A few sessile non-bleeding polyps of benign appearance were found in the fundus and stomach body. Findings were suggestive of fundic gland-type polyp(s).  -A single localized nodule of benign appearance was seen in the pre-pyloric region. This finding was suggestive of a submucosal nodule/tumor. Multiple cold forceps random only biopsies were performed in the pre-pyloric region.      Impressions:    	Normal mucosa in the whole esophagus.  	Erythema in the stomach compatible with non-erosive gastritis. (Biopsy).  	Polyps in the fundus and stomach body.  	Nodule in the pre-pyloric region. (Biopsy).  	Diverticulum in the Second part of duodenum.    Plan:	    Return to floor for further management  Low Fiber/Residue Diet  Advance diet as tolerated  Await pathology  PPI PO daily  Will discuss with advanced endoscopists for EGD/EUS in the future  Prioritize outpatient colonoscopy w/ 2 day prep recommended.      Lincoln Victoria DO, PhD  Gastroenterology Fellow  GI Consult Weekday 7am-5pm Pager: [phone]  Weeknights/Weekend/Holiday Coverage: Please Call the  for contact info
Planned for EGD and colonoscopy today. However, patient diagnosed with atrial fibrillation and recommended initiation of anticoagulation with heparin drip. EGD and colonoscopy cancelled as patient not received anticoagulation. Please initiate heparin drip tonight and plan to discontinue in the morning prior to endoscopic evaluation. Will plan for EGD 5/16/25. Will cancel colonoscopy as patient will not tolerate bowel prep. Clear liquid diet 5/15/25. NPO except medicaitons after 11:59pm 5/15/25.       Derrick Baig DO  Gastroenterology Fellow  GI Consult Pager Weekdays 7am-5pm: [phone]  Weeknights/Weekend/Holiday Coverage: Please Call the  for Contact Information  Follow Up Questions Welcome via [place] [employer] Teams Messenger

## 2025-05-16 NOTE — DISCHARGE NOTE PROVIDER - NSDCMRMEDTOKEN_GEN_ALL_CORE_FT
AMLODIPINE   TAB 5MG:   ELIQUIS 5 MG Tablet: TAKE 1 TABLET (5 MG TOTAL) BY MOUTH 2 (TWO) TIMES A DAY.  METOPROLOL SUCCINATE ER 25 MG Tablet Extended Release 24 Hour: TAKE 1 TABLET BY MOUTH ONCE A DAY  OMEPRAZOLE DR 40 MG Capsule Delayed Release: TAKE 1 TABLET BY MOUTH ONCE A DAY  VALSARTAN    TAB 160MG:    AMLODIPINE   TAB 5MG:   ELIQUIS 5 MG Tablet: TAKE 1 TABLET (5 MG TOTAL) BY MOUTH 2 (TWO) TIMES A DAY.  METOPROLOL SUCCINATE ER 25 MG Tablet Extended Release 24 Hour: TAKE 1 TABLET BY MOUTH ONCE A DAY  Multiple Vitamins oral tablet: 1 tab(s) orally every 24 hours  pantoprazole 40 mg oral delayed release tablet: 1 tab(s) orally once a day (before a meal)  thiamine 100 mg oral tablet: 1 tab(s) orally every 24 hours   amLODIPine 5 mg oral tablet: 1 tab(s) orally every 24 hours  ELIQUIS 5 MG Tablet: TAKE 1 TABLET (5 MG TOTAL) BY MOUTH 2 (TWO) TIMES A DAY.  Metoprolol Succinate ER 25 mg oral tablet, extended release: 1 tab(s) orally once a day  Multiple Vitamins oral tablet: 1 tab(s) orally every 24 hours  pantoprazole 40 mg oral delayed release tablet: 1 tab(s) orally once a day (before a meal)  thiamine 100 mg oral tablet: 1 tab(s) orally every 24 hours

## 2025-05-16 NOTE — PROGRESS NOTE ADULT - CONVERSATION DETAILS
Per discussion with patient and daughter Yoko she wants yoko to be the HCP   She would elect for CPR if she underwent cardiopulmonary arrest.   She would elect for NIV measures and intubation if needed in the event she required ventiliatory support.   NG and PEG within GOC    patient is full code

## 2025-05-16 NOTE — PROGRESS NOTE ADULT - PROBLEM SELECTOR PLAN 5
Fluids: s/p 1L LR bolus   Electrolytes: replete prn   Diet: NPO except medications, until after egd +/- colonoscopy  DVT PPx: previously on eliquis   GI PPx: PPI   Dispo: F

## 2025-05-16 NOTE — PROGRESS NOTE ADULT - SUBJECTIVE AND OBJECTIVE BOX
Patient is a 75y old  Female who presents with a chief complaint of Inability to tolerate PO/ Gastric Pain (15 May 2025 17:44)      HOSPITAL COURSE: 75yF pertinent hx of Afib (LD eliquis on 5/13), cholelithiasis admitted for persistent abdominal pain/inability to tolerate PO intake pending GI evaluation differential including peptic ulcer disease, vs malignancy, structural component (intraluminal vs extraluminal), neurological component. Patient was planned for EGD and colonoscopy on 5/15 however per GI patient was no on AC prior, and required cardiac clearance prior to procedure, rescheduled for EGD on 5/16 in am. Patient noting her symptoms are improving. Patient underwent preoperative exam intermediate risk for low risk procedure per cardiology. Pending EGD and further GI/ Cards recommendations.      OVERNIGHT EVENTS: SILVERIO    SUBJECTIVE: patient in the bedside this morning. They said she has not eaten. She said her abdominal has gotten better, no nausea or vomiting this morning. slightly better just anticipating procedure. Feels slightly weak on her feet. Otherwise he’s doing well.  ROS: otherwise negative      T(C): 36.3 (05-16-25 @ 06:30), Max: 36.8 (05-15-25 @ 19:58)  HR: 81 (05-16-25 @ 06:30) (67 - 81)  BP: 146/94 (05-16-25 @ 06:30) (109/72 - 146/94)  RR: 18 (05-16-25 @ 06:30) (18 - 18)  SpO2: 96% (05-16-25 @ 06:30) (94% - 96%)  Wt(kg): --Vital Signs Last 24 Hrs  T(C): 36.3 (16 May 2025 06:30), Max: 36.8 (15 May 2025 19:58)  T(F): 97.4 (16 May 2025 06:30), Max: 98.3 (15 May 2025 19:58)  HR: 81 (16 May 2025 06:30) (67 - 81)  BP: 146/94 (16 May 2025 06:30) (109/72 - 146/94)  BP(mean): --  RR: 18 (16 May 2025 06:30) (18 - 18)  SpO2: 96% (16 May 2025 06:30) (94% - 96%)    Parameters below as of 16 May 2025 06:30  Patient On (Oxygen Delivery Method): room air      PHYSICAL EXAM:  Constitutional: resting comfortably in bed; NAD  Eyes: PERRL, EOMI  ENT: no nasal discharge; MMM  Neck: supple; no JVD or thyromegaly  Respiratory: CTA B/L; no W/R/R, no retractions  Cardiac: +S1/S2; RRR; no M/R/G  Gastrointestinal: soft, epigastric tenderness, nondistended; no rebound or guarding; +BSx4  Extremities: WWP, no clubbing or cyanosis; no peripheral edema. varicose veins bilaterally. Capillary refill <2 sec  Musculoskeletal: NROM x4; no joint swelling, tenderness or erythema  Vascular: 2+ radial, DP/PT pulses B/L  Dermatologic: skin warm, dry and intact; no rashes, wounds, or scars  Neurologic: AAOx3  Psychiatric: tangential with conversation.     LABS:                        15.0   10.82 )-----------( 168      ( 15 May 2025 08:51 )             45.4     05-15    134[L]  |  97  |  11  ----------------------------<  75  3.9   |  20[L]  |  0.81    Ca    9.6      15 May 2025 08:51  Phos  4.1     05-15  Mg     2.3     05-15    TPro  6.8  /  Alb  4.1  /  TBili  0.8  /  DBili  x   /  AST  25  /  ALT  17  /  AlkPhos  73  05-15        Urinalysis Basic - ( 15 May 2025 08:51 )    Color: x / Appearance: x / SG: x / pH: x  Gluc: 75 mg/dL / Ketone: x  / Bili: x / Urobili: x   Blood: x / Protein: x / Nitrite: x   Leuk Esterase: x / RBC: x / WBC x   Sq Epi: x / Non Sq Epi: x / Bacteria: x      CAPILLARY BLOOD GLUCOSE            Urinalysis Basic - ( 15 May 2025 08:51 )    Color: x / Appearance: x / SG: x / pH: x  Gluc: 75 mg/dL / Ketone: x  / Bili: x / Urobili: x   Blood: x / Protein: x / Nitrite: x   Leuk Esterase: x / RBC: x / WBC x   Sq Epi: x / Non Sq Epi: x / Bacteria: x        MEDICATIONS  (STANDING):  amLODIPine   Tablet 5 milliGRAM(s) Oral every 24 hours  famotidine Injectable 20 milliGRAM(s) IV Push every 24 hours  metoprolol succinate ER 25 milliGRAM(s) Oral daily  multivitamin 1 Tablet(s) Oral every 24 hours  pantoprazole  Injectable 40 milliGRAM(s) IV Push every 24 hours  thiamine 100 milliGRAM(s) Oral every 24 hours    MEDICATIONS  (PRN):  acetaminophen     Tablet .. 650 milliGRAM(s) Oral every 6 hours PRN Temp greater or equal to 38C (100.4F), Mild Pain (1 - 3)  aluminum hydroxide/magnesium hydroxide/simethicone Suspension 30 milliLiter(s) Oral every 4 hours PRN Dyspepsia  melatonin 3 milliGRAM(s) Oral at bedtime PRN Insomnia  ondansetron Injectable 4 milliGRAM(s) IV Push every 8 hours PRN Nausea and/or Vomiting      RADIOLOGY & ADDITIONAL TESTS: Reviewed

## 2025-05-16 NOTE — DISCHARGE NOTE NURSING/CASE MANAGEMENT/SOCIAL WORK - FINANCIAL ASSISTANCE
[facility] provides services at a reduced cost to those who are determined to be eligible through [facility]’s financial assistance program. Information regarding [facility]’s financial assistance program can be found by going to [email or web address] or by calling [phone].

## 2025-05-16 NOTE — DISCHARGE NOTE NURSING/CASE MANAGEMENT/SOCIAL WORK - PATIENT PORTAL LINK FT
You can access the FollowMyHealth Patient Portal offered by [facility] by registering at the following website: [email or web address]. By joining [facility]’s FollowMyHealth portal, you will also be able to view your health information using other applications (apps) compatible with our system.

## 2025-05-16 NOTE — PRE-ANESTHESIA EVALUATION ADULT - NSANTHPMHFT_GEN_ALL_CORE
Cardiac: Positive for HTN, HLD, Atrial Fibrillation. Denies MI/CAD/Angina/Heart Failure. Denies Murmur/Valvular Disorder. <4 METS  Pulmonary: Denies Asthma, COPd, TIM  Renal: Denies kidney dysfunction  Hepatic: Denies liver dysfunction  Gastrointestinal: Positive for Abdominal Pain, Nausea, Vomiting, Weight Loss, Inability to Tolerate Oral Intake. Denies GERD/IBD.  Endocrine: Denies DM or thyroid dysfunction.  Neurologic: Denies stroke/seizure disorder  Hematologic: Denies anemia, blood clotting disorder, blood thinning medication.    PSH: Non-contributory.

## 2025-05-16 NOTE — PROGRESS NOTE ADULT - PROBLEM SELECTOR PLAN 4
Pt w hx of newly diagnosed afib, started on Eliquis and metoprolol 25mg ER   - has been unable to tolerate Eliquis/BB for the last week, as such can start heparin gtt following coag profile if still unable to tolerate oral   - c/w metoprolol 25mg qd XR  - off eliquis pending procedure, per cards prelim note heparin bridge not required prior to procedure Pt w hx of newly diagnosed afib, started on Eliquis and metoprolol 25mg ER   - has been unable to tolerate Eliquis/BB for the last week, as such can start heparin gtt following coag profile if still unable to tolerate oral   - c/w metoprolol 25mg qd XR  - off eliquis pending procedure, per cards heparin bridge not required prior to procedure

## 2025-05-16 NOTE — PRE-ANESTHESIA EVALUATION ADULT - NS MD HP INPLANTS MED DEV
"Debridement    Date/Time: 3/22/2022 8:00 AM  Performed by: Didier Carlson DO  Authorized by: Didier Carlson DO     Time out: Immediately prior to procedure a "time out" was called to verify the correct patient, procedure, equipment, support staff and site/side marked as required.    Consent Done?:  Yes (Written)    Preparation: Patient was prepped and draped in usual sterile fashion    Local anesthesia used?: Yes    Anesthesia:  Local infiltration  Local anesthetic:  Lidocaine 2% topical gel  Anesthetic total (ml):  5    Wound Details:    Location:  Left leg       Length (cm):  3       Area (sq cm):  9       Width (cm):  3       Percent Debrided (%):  100       Depth (cm):  0.1       Total Area Debrided (sq cm):  9    Depth of debridement:  Subcutaneous tissue    Tissue debrided:  Subcutaneous    Devitalized tissue debrided:  Biofilm, Exudate, Fibrin, Necrotic/Eschar and Slough    Instruments:  Curette    Bleeding:  Minimal  Hemostasis Achieved: Yes    Method Used:  Pressure  Patient tolerance:  Patient tolerated the procedure well with no immediate complications      " None

## 2025-05-16 NOTE — DISCHARGE NOTE PROVIDER - NSDCCPTREATMENT_GEN_ALL_CORE_FT
PRINCIPAL PROCEDURE  Procedure: Provide patient education  Findings and Treatment: EGD findings:     PRINCIPAL PROCEDURE  Procedure: Provide patient education  Findings and Treatment: EGD findings:  Please follow up with your PCP regarding the following CTAP finding  ADRENALS: 1.1 cm left adrenal hypodensity.     PRINCIPAL PROCEDURE  Procedure: Provide patient education  Findings and Treatment: EGD findings:  A single non-bleeding diverticulum with a large opening was seen in the Second part of duodenum, normal mucosa was noted in the whole esophagus., diffuse erythema of the mucosa with no bleeding was noted in the stomach. These findings are compatible with non-erosive gastritis. Multiple cold forceps random only biopsies were performed for H. pylori in the antrum and stomach body, A few sessile non-bleeding polyps of benign appearance were found in the fundus and stomach body. Findings were suggestive of fundic gland-type polyp(s)., A single localized nodule of benign appearance was seen in the pre-pyloric region. This finding was suggestive of a submucosal nodule/tumor. Multiple cold forceps random only biopsies were performed in the pre-pyloric region.  Impressions:  	Normal mucosa in the whole esophagus.  	Erythema in the stomach compatible with non-erosive gastritis. (Biopsy).  	Polyps in the fundus and stomach body.  	Nodule in the pre-pyloric region. (Biopsy).  	Diverticulum in the Second part of duodenum.  Please follow up with your PCP regarding the following CTAP finding  ADRENALS: 1.1 cm left adrenal hypodensity.  Please follow up with your primary care doctor regarding restarting valsartan, continue to check your blood pressure at home.

## 2025-05-16 NOTE — DISCHARGE NOTE PROVIDER - NSDCCPCAREPLAN_GEN_ALL_CORE_FT
PRINCIPAL DISCHARGE DIAGNOSIS  Diagnosis: Nausea and vomiting  Assessment and Plan of Treatment: Gastritis is an inflammation of the stomach lining, which can cause a range of symptoms from mild indigestion to severe pain. Common symptoms include burning or gnawing pain in the upper abdomen, nausea, vomiting, and a feeling of fullness after eating even small amounts. Gastritis can be caused by various factors, including bacterial infections (most commonly H. pylori), regular use of nonsteroidal anti-inflammatory drugs (NSAIDs) like ibuprofen or naproxen, excessive alcohol consumption, and stress. Treatment often involves lifestyle modifications such as avoiding irritating foods, quitting smoking, and limiting alcohol intake. Your doctor might also recommend medications to reduce stomach acid production, neutralize existing acid, or treat an H. pylori infection if present. If you experience persistent or severe stomach pain, bloody or black stools, or difficulty swallowing, it's important to seek medical attention promptly.      SECONDARY DISCHARGE DIAGNOSES  Diagnosis: Adult failure to thrive  Assessment and Plan of Treatment: This is in the setting of poor oral intake of food while having nausea and vomiting.

## 2025-05-16 NOTE — PROGRESS NOTE ADULT - REASON FOR ADMISSION
Inability to tolerate PO/ Gastric Pain

## 2025-05-20 ENCOUNTER — NON-APPOINTMENT (OUTPATIENT)
Age: 75
End: 2025-05-20

## 2025-05-25 NOTE — PATIENT PROFILE ADULT - NSPRESCRUSEDDRG_GEN_A_NUR
Reason For Consult  Acute kidney injury    History Of Present Illness  Nancy Long is a 83 y.o. female presenting with confusion ;recent onset of COVID-19 and urinary tract infection  Patient had abnormal urine studies with more than 50 WBC per high-power field hematuria with more than 11-20 RBCs per high-powered field and 4+ bacteria on admission urinalysis.  Her renal chemistries did note a BUN of 73 and creatinine of 2.57 her serum creatinine has been ranging between 1.1-1.3 for the past few months.  She also have elevation of troponin's in the 20 range.      She had a normal urinary indices with urine albumin creatinine ratio elevated at 208.3  Her fractional excretion of sodium was 1.84%  Fractional excretion of urea was 22.61%  And she have abnormal lipid panel with cholesterol of only 57, and LDL of 8 currently on atorvastatin.  Patient was placed on IV antibiotics and she received fluid resuscitation, currently on Ringer's lactate infusion, with improvement of her renal chemistries today BUN down to 57 and serum creatinine down to 1.53.        Patient had renal ultrasound which showed normal-sized kidneys with normal echogenicity  And x-ray of the chest done yesterday disclosed bilateral opacities consistent with pneumonia            Past Medical History  She has a past medical history of Personal history of other diseases of the circulatory system and Personal history of other endocrine, nutritional and metabolic disease.  Recent COVID-19 infection  Breast cancer  Chronic kidney disease stage III  Type 2 diabetes  Gout  Hypertension  Coronary artery disease  Diastolic congestive heart failure  Hyperlipidemia  Peripheral arterial disease  Nephrolithiasis  Small bowel obstructions  Ascites  Diverticular disease of the colon  Surgical History  She has a past surgical history that includes Cholecystectomy (01/03/2014); Appendectomy (01/03/2014); Breast lumpectomy (01/03/2014); and Oophorectomy  (01/03/2014).     Social History  She reports that she has quit smoking. Her smoking use included cigarettes. She has never used smokeless tobacco. She reports that she does not currently use alcohol. She reports that she does not use drugs.    Family History  Family History[1]  Father had a clotting disorder  Allergies  Penicillins and Sulfa (sulfonamide antibiotics)    Review of Systems  10 point review of system performed and negative except what is mentioned in the HPI     Physical Exam  HEENT; pupils react to light and accommodation  Neck; no JVD ; right carotid bruit no thyromegaly; no adenopathy  Lungs; bibasilar crackles on expiration  Heart; regular rate no gallops or rubs no thrills  Abdomen active peristalsis no rebound guarding organomegaly or shifting dullness  Skin; decreased turgor  Neurological; patient is confused there is no clonus no asterixis or tremors;       I&O 24 HR    Intake/Output Summary (Last 24 hours) at 5/25/2025 1424  Last data filed at 5/25/2025 0300  Gross per 24 hour   Intake 1300 ml   Output 2200 ml   Net -900 ml       Vitals 24 HR  Heart Rate:  []   Temp:  [36.4 °C (97.5 °F)-37 °C (98.6 °F)]   Resp:  [16-18]   BP: (145-177)/(65-74)   SpO2:  [94 %-96 %]         Relevant Results  Results for orders placed or performed during the hospital encounter of 05/23/25 (from the past 24 hours)   POCT GLUCOSE   Result Value Ref Range    POCT Glucose 208 (H) 74 - 99 mg/dL   POCT GLUCOSE   Result Value Ref Range    POCT Glucose 183 (H) 74 - 99 mg/dL   Urine electrolytes   Result Value Ref Range    Sodium, Urine Random 64 mmol/L    Sodium/Creatinine Ratio 106 Not established. mmol/g Creat    Potassium, Urine Random 29 mmol/L    Potassium/Creatinine Ratio 48 Not established mmol/g Creat    Chloride, Urine Random 42 mmol/L    Chloride/Creatinine Ratio 70 38 - 318 mmol/g creat    Creatinine, Urine Random 60.2 20.0 - 320.0 mg/dL   Osmolality, urine   Result Value Ref Range    Osmolality, Urine  Random 350 200 - 1,200 mOsm/kg   Urea Nitrogen, Urine Random   Result Value Ref Range    Urea Nitrogen, Urine Random 411 mg/dL    Creatinine, Urine Random 60.2 20.0 - 320.0 mg/dL    Urea Nitrogen/Creatinine Ratio 6.8 Not established. g/g creat   Albumin-Creatinine Ratio, Urine Random   Result Value Ref Range    Albumin, Urine Random 125.4 Not established mg/L    Creatinine, Urine Random 60.2 20.0 - 320.0 mg/dL    Albumin/Creatinine Ratio 208.3 (H) <30.0 ug/mg Creat   CBC and Auto Differential   Result Value Ref Range    WBC 11.7 (H) 4.4 - 11.3 x10*3/uL    nRBC 0.0 0.0 - 0.0 /100 WBCs    RBC 3.46 (L) 4.00 - 5.20 x10*6/uL    Hemoglobin 10.0 (L) 12.0 - 16.0 g/dL    Hematocrit 32.4 (L) 36.0 - 46.0 %    MCV 94 80 - 100 fL    MCH 28.9 26.0 - 34.0 pg    MCHC 30.9 (L) 32.0 - 36.0 g/dL    RDW 14.3 11.5 - 14.5 %    Platelets 167 150 - 450 x10*3/uL    Neutrophils % 79.9 40.0 - 80.0 %    Immature Granulocytes %, Automated 4.1 (H) 0.0 - 0.9 %    Lymphocytes % 10.5 13.0 - 44.0 %    Monocytes % 5.3 2.0 - 10.0 %    Eosinophils % 0.0 0.0 - 6.0 %    Basophils % 0.2 0.0 - 2.0 %    Neutrophils Absolute 9.35 (H) 1.60 - 5.50 x10*3/uL    Immature Granulocytes Absolute, Automated 0.48 0.00 - 0.50 x10*3/uL    Lymphocytes Absolute 1.23 0.80 - 3.00 x10*3/uL    Monocytes Absolute 0.62 0.05 - 0.80 x10*3/uL    Eosinophils Absolute 0.00 0.00 - 0.40 x10*3/uL    Basophils Absolute 0.02 0.00 - 0.10 x10*3/uL   Comprehensive Metabolic Panel   Result Value Ref Range    Glucose 198 (H) 74 - 99 mg/dL    Sodium 138 136 - 145 mmol/L    Potassium 4.4 3.5 - 5.3 mmol/L    Chloride 102 98 - 107 mmol/L    Bicarbonate 28 21 - 32 mmol/L    Anion Gap 12 10 - 20 mmol/L    Urea Nitrogen 57 (H) 6 - 23 mg/dL    Creatinine 1.53 (H) 0.50 - 1.05 mg/dL    eGFR 34 (L) >60 mL/min/1.73m*2    Calcium 8.3 (L) 8.6 - 10.3 mg/dL    Albumin 2.9 (L) 3.4 - 5.0 g/dL    Alkaline Phosphatase 111 33 - 136 U/L    Total Protein 6.1 (L) 6.4 - 8.2 g/dL    AST 40 (H) 9 - 39 U/L    Bilirubin,  Total 0.4 0.0 - 1.2 mg/dL    ALT 15 7 - 45 U/L   Uric Acid   Result Value Ref Range    Uric Acid 7.7 (H) 2.3 - 6.7 mg/dL   Lipid Panel   Result Value Ref Range    Cholesterol 57 0 - 199 mg/dL    HDL-Cholesterol 21.6 mg/dL    Cholesterol/HDL Ratio 2.6     LDL Calculated 8 <=99 mg/dL    VLDL 28 0 - 40 mg/dL    Triglycerides 139 0 - 149 mg/dL    Non HDL Cholesterol 35 0 - 149 mg/dL   POCT GLUCOSE   Result Value Ref Range    POCT Glucose 184 (H) 74 - 99 mg/dL   POCT GLUCOSE   Result Value Ref Range    POCT Glucose 194 (H) 74 - 99 mg/dL       US renal complete  Result Date: 5/25/2025  Interpreted By:  Shayla Melissa, STUDY: US RENAL COMPLETE; 5/25/2025 2:42 am   INDICATION: Signs/Symptoms:Renal Failure.   COMPARISON: None.   ACCESSION NUMBER(S): [id number]   ORDERING CLINICIAN: KRISTY STILES   TECHNIQUE: Grayscale and color Doppler imaging of the kidneys.   FINDINGS: The right kidney measures 11.3cm in length. The left kidney measures 11.7cm in length. There is no shadowing calculus, hydronephrosis, or solid mass identified. The renal cortical echogenicity is normal but there is mild bilateral renal cortical thinning. 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   Urinary bladder is distended without bladder wall thickening. There is some echogenic debris layering along the dependent portion of the urinary bladder. Neither the right nor left ureteral jet is observed.       Duplicated collecting systems bilaterally.   No renal atrophy or hydronephrosis.   Mild bilateral renal cortical thinning.   Distended urinary bladder containing a small amount of debris along its dependent portion. Neither the right nor left ureteral jet is seen.   MACRO: None.   Signed by: Shayla Melissa 5/25/2025 5:56 AM Dictation workstation:   [id number]    XR chest 1 view  Result Date: 5/24/2025  Interpreted By:  Erik Evans, STUDY: XR CHEST 1 VIEW;  5/24/2025 1:40 am   INDICATION: Signs/Symptoms:bandemia.   COMPARISON:  4/12/2025   ACCESSION NUMBER(S): [id number]   ORDERING CLINICIAN: JALYN WILLETT   FINDINGS: The cardiac silhouette is normal in size. Atherosclerotic calcification of the thoracic aorta. There are bilateral opacities compatible with pneumonia. No significant pleural effusion. No pneumothorax.       Bilateral opacities compatible with pneumonia.   MACRO: None   Signed by: Erik Evans 5/24/2025 2:47 AM Dictation workstation:   [id number]    CT head wo IV contrast  Result Date: 5/23/2025  Interpreted By:  Erik Evans, STUDY: CT HEAD WO IV CONTRAST;  5/23/2025 11:06 pm   INDICATION: Signs/Symptoms:ams.   COMPARISON: None   ACCESSION NUMBER(S): [id number]   ORDERING CLINICIAN: SAM PHOENIX   TECHNIQUE: Contiguous axial images of the head were obtained without intravenous contrast.   FINDINGS: BRAIN PARENCHYMA:  There is cerebral atrophy and chronic periventricular white matter small vessel ischemic change. The gray white matter differentiation is preserved.  No mass effect or midline shift.   HEMORRHAGE:  No evidence of acute intracranial hemorrhage. VENTRICLES AND EXTRA-AXIAL SPACES:  The ventricles are within normal limits in size for brain volume.  No evidence of abnormal extraaxial fluid collection. EXTRACRANIAL SOFT TISSUES:  Within normal limits. PARANASAL SINUSES/MASTOIDS:  The visualized paranasal sinuses and mastoid air cells are clear and well pneumatized. CALVARIUM:  No evidence of depressed calvarial fracture.   OTHER FINDINGS:  None       Cerebral atrophy and chronic periventricular white matter small vessel ischemic change.   No evidence of acute intracranial hemorrhage.   MACRO: None   Signed by: Erik Evans 5/23/2025 11:30 PM Dictation workstation:   [id number]        Assessment & Plan  COVID-19    Bandemia    Acute kidney injury superimposed on CKD  Rule out rhabdomyolysis  Severe protein calorie malnutrition  Sarcopenia  Hyperuricemia  Urinary tract infection  Hematuria  Plan;  discontinue rosuvastatin  Hold angiotensin receptor blockers  Hold proton pump inhibitors  Continue fluid resuscitation  Serum myoglobin  Chemistries and CBC  Iron indices  Hold potential nephrotoxins  Thank you very much for allowing me to participate in the care of this patient  I spent  60 minutes in the professional and overall care of this patient.      Ranjit Dumont MD         [1] No family history on file.     No

## 2025-05-28 DIAGNOSIS — K31.7 POLYP OF STOMACH AND DUODENUM: ICD-10-CM

## 2025-05-28 DIAGNOSIS — R11.2 NAUSEA WITH VOMITING, UNSPECIFIED: ICD-10-CM

## 2025-05-28 DIAGNOSIS — Z79.01 LONG TERM (CURRENT) USE OF ANTICOAGULANTS: ICD-10-CM

## 2025-05-28 DIAGNOSIS — R62.7 ADULT FAILURE TO THRIVE: ICD-10-CM

## 2025-05-28 DIAGNOSIS — I10 ESSENTIAL (PRIMARY) HYPERTENSION: ICD-10-CM

## 2025-05-28 DIAGNOSIS — K57.10 DIVERTICULOSIS OF SMALL INTESTINE WITHOUT PERFORATION OR ABSCESS WITHOUT BLEEDING: ICD-10-CM

## 2025-05-28 DIAGNOSIS — M19.90 UNSPECIFIED OSTEOARTHRITIS, UNSPECIFIED SITE: ICD-10-CM

## 2025-05-28 DIAGNOSIS — I48.91 UNSPECIFIED ATRIAL FIBRILLATION: ICD-10-CM

## 2025-05-28 DIAGNOSIS — Z74.09 OTHER REDUCED MOBILITY: ICD-10-CM

## 2025-05-28 DIAGNOSIS — K29.70 GASTRITIS, UNSPECIFIED, WITHOUT BLEEDING: ICD-10-CM

## 2025-06-05 RX ORDER — POLYETHYLENE GLYCOL 3350 AND ELECTROLYTES WITH LEMON FLAVOR 236; 22.74; 6.74; 5.86; 2.97 G/4L; G/4L; G/4L; G/4L; G/4L
236 POWDER, FOR SOLUTION ORAL
Qty: 1 | Refills: 0 | Status: ACTIVE | COMMUNITY
Start: 2025-06-05 | End: 1900-01-01

## 2025-07-08 ENCOUNTER — APPOINTMENT (OUTPATIENT)
Dept: GASTROENTEROLOGY | Facility: HOSPITAL | Age: 75
End: 2025-07-08

## 2025-07-14 ENCOUNTER — APPOINTMENT (OUTPATIENT)
Dept: GASTROENTEROLOGY | Facility: CLINIC | Age: 75
End: 2025-07-14

## 2025-07-23 ENCOUNTER — EMERGENCY (EMERGENCY)
Facility: HOSPITAL | Age: 75
LOS: 1 days | End: 2025-07-23
Attending: EMERGENCY MEDICINE | Admitting: EMERGENCY MEDICINE
Payer: MEDICARE

## 2025-07-23 VITALS
TEMPERATURE: 98 F | SYSTOLIC BLOOD PRESSURE: 125 MMHG | RESPIRATION RATE: 18 BRPM | HEART RATE: 97 BPM | OXYGEN SATURATION: 100 % | DIASTOLIC BLOOD PRESSURE: 66 MMHG

## 2025-07-23 VITALS
OXYGEN SATURATION: 97 % | WEIGHT: 199.96 LBS | SYSTOLIC BLOOD PRESSURE: 109 MMHG | HEART RATE: 89 BPM | DIASTOLIC BLOOD PRESSURE: 89 MMHG | RESPIRATION RATE: 18 BRPM | TEMPERATURE: 99 F | HEIGHT: 66 IN

## 2025-07-23 LAB
ANION GAP SERPL CALC-SCNC: 10 MMOL/L — SIGNIFICANT CHANGE UP (ref 5–17)
BASOPHILS # BLD AUTO: 0.07 K/UL — SIGNIFICANT CHANGE UP (ref 0–0.2)
BASOPHILS NFR BLD AUTO: 1 % — SIGNIFICANT CHANGE UP (ref 0–2)
BUN SERPL-MCNC: 23 MG/DL — SIGNIFICANT CHANGE UP (ref 7–23)
CALCIUM SERPL-MCNC: 9.3 MG/DL — SIGNIFICANT CHANGE UP (ref 8.4–10.5)
CHLORIDE SERPL-SCNC: 98 MMOL/L — SIGNIFICANT CHANGE UP (ref 96–108)
CO2 SERPL-SCNC: 29 MMOL/L — SIGNIFICANT CHANGE UP (ref 22–31)
CREAT SERPL-MCNC: 0.73 MG/DL — SIGNIFICANT CHANGE UP (ref 0.5–1.3)
EGFR: 86 ML/MIN/1.73M2 — SIGNIFICANT CHANGE UP
EGFR: 86 ML/MIN/1.73M2 — SIGNIFICANT CHANGE UP
EOSINOPHIL # BLD AUTO: 0.07 K/UL — SIGNIFICANT CHANGE UP (ref 0–0.5)
EOSINOPHIL NFR BLD AUTO: 1 % — SIGNIFICANT CHANGE UP (ref 0–6)
FLUAV AG NPH QL: SIGNIFICANT CHANGE UP
FLUBV AG NPH QL: SIGNIFICANT CHANGE UP
GLUCOSE SERPL-MCNC: 141 MG/DL — HIGH (ref 70–99)
HCT VFR BLD CALC: 33.9 % — LOW (ref 34.5–45)
HGB BLD-MCNC: 10.4 G/DL — LOW (ref 11.5–15.5)
IMM GRANULOCYTES # BLD AUTO: 0.42 K/UL — HIGH (ref 0–0.07)
IMM GRANULOCYTES NFR BLD AUTO: 6 % — HIGH (ref 0–0.9)
LYMPHOCYTES # BLD AUTO: 1.62 K/UL — SIGNIFICANT CHANGE UP (ref 1–3.3)
LYMPHOCYTES NFR BLD AUTO: 23.2 % — SIGNIFICANT CHANGE UP (ref 13–44)
MCHC RBC-ENTMCNC: 28 PG — SIGNIFICANT CHANGE UP (ref 27–34)
MCHC RBC-ENTMCNC: 30.7 G/DL — LOW (ref 32–36)
MCV RBC AUTO: 91.1 FL — SIGNIFICANT CHANGE UP (ref 80–100)
MONOCYTES # BLD AUTO: 4.2 K/UL — HIGH (ref 0–0.9)
MONOCYTES NFR BLD AUTO: 60.1 % — HIGH (ref 2–14)
NEUTROPHILS # BLD AUTO: 0.61 K/UL — LOW (ref 1.8–7.4)
NEUTROPHILS NFR BLD AUTO: 8.7 % — LOW (ref 43–77)
NRBC # BLD AUTO: 0 K/UL — SIGNIFICANT CHANGE UP (ref 0–0)
NRBC # FLD: 0 K/UL — SIGNIFICANT CHANGE UP (ref 0–0)
NRBC BLD AUTO-RTO: 0 /100 WBCS — SIGNIFICANT CHANGE UP (ref 0–0)
NT-PROBNP SERPL-SCNC: 1804 PG/ML — HIGH (ref 0–300)
PLATELET # BLD AUTO: 234 K/UL — SIGNIFICANT CHANGE UP (ref 150–400)
PMV BLD: 12.3 FL — SIGNIFICANT CHANGE UP (ref 7–13)
POTASSIUM SERPL-MCNC: 3.8 MMOL/L — SIGNIFICANT CHANGE UP (ref 3.5–5.3)
POTASSIUM SERPL-SCNC: 3.8 MMOL/L — SIGNIFICANT CHANGE UP (ref 3.5–5.3)
RBC # BLD: 3.72 M/UL — LOW (ref 3.8–5.2)
RBC # FLD: 14.3 % — SIGNIFICANT CHANGE UP (ref 10.3–14.5)
RSV RNA NPH QL NAA+NON-PROBE: SIGNIFICANT CHANGE UP
SARS-COV-2 RNA SPEC QL NAA+PROBE: SIGNIFICANT CHANGE UP
SODIUM SERPL-SCNC: 137 MMOL/L — SIGNIFICANT CHANGE UP (ref 135–145)
SOURCE RESPIRATORY: SIGNIFICANT CHANGE UP
TROPONIN T, HIGH SENSITIVITY RESULT: 9 NG/L — SIGNIFICANT CHANGE UP (ref 0–51)
WBC # BLD: 6.99 K/UL — SIGNIFICANT CHANGE UP (ref 3.8–10.5)
WBC # FLD AUTO: 6.99 K/UL — SIGNIFICANT CHANGE UP (ref 3.8–10.5)

## 2025-07-23 PROCEDURE — 85025 COMPLETE CBC W/AUTO DIFF WBC: CPT

## 2025-07-23 PROCEDURE — 99285 EMERGENCY DEPT VISIT HI MDM: CPT

## 2025-07-23 PROCEDURE — 84484 ASSAY OF TROPONIN QUANT: CPT

## 2025-07-23 PROCEDURE — 83880 ASSAY OF NATRIURETIC PEPTIDE: CPT

## 2025-07-23 PROCEDURE — 71045 X-RAY EXAM CHEST 1 VIEW: CPT | Mod: 26

## 2025-07-23 PROCEDURE — 87637 SARSCOV2&INF A&B&RSV AMP PRB: CPT

## 2025-07-23 PROCEDURE — 93005 ELECTROCARDIOGRAM TRACING: CPT

## 2025-07-23 PROCEDURE — 36415 COLL VENOUS BLD VENIPUNCTURE: CPT

## 2025-07-23 PROCEDURE — 99285 EMERGENCY DEPT VISIT HI MDM: CPT | Mod: 25

## 2025-07-23 PROCEDURE — 71045 X-RAY EXAM CHEST 1 VIEW: CPT

## 2025-07-23 PROCEDURE — 94640 AIRWAY INHALATION TREATMENT: CPT

## 2025-07-23 PROCEDURE — 93010 ELECTROCARDIOGRAM REPORT: CPT

## 2025-07-23 PROCEDURE — 80048 BASIC METABOLIC PNL TOTAL CA: CPT

## 2025-07-23 RX ORDER — PREDNISONE 20 MG/1
60 TABLET ORAL ONCE
Refills: 0 | Status: COMPLETED | OUTPATIENT
Start: 2025-07-23 | End: 2025-07-23

## 2025-07-23 RX ORDER — AMOXICILLIN AND CLAVULANATE POTASSIUM 500; 125 MG/1; MG/1
1 TABLET, FILM COATED ORAL
Qty: 20 | Refills: 0
Start: 2025-07-23 | End: 2025-08-01

## 2025-07-23 RX ORDER — ALBUTEROL SULFATE 2.5 MG/3ML
2 VIAL, NEBULIZER (ML) INHALATION
Qty: 1 | Refills: 0
Start: 2025-07-23

## 2025-07-23 RX ORDER — PREDNISONE 20 MG/1
2 TABLET ORAL
Qty: 8 | Refills: 0
Start: 2025-07-23 | End: 2025-07-26

## 2025-07-23 RX ORDER — IPRATROPIUM BROMIDE AND ALBUTEROL SULFATE .5; 2.5 MG/3ML; MG/3ML
3 SOLUTION RESPIRATORY (INHALATION)
Refills: 0 | Status: COMPLETED | OUTPATIENT
Start: 2025-07-23 | End: 2025-07-23

## 2025-07-23 RX ADMIN — PREDNISONE 60 MILLIGRAM(S): 20 TABLET ORAL at 10:29

## 2025-07-23 RX ADMIN — IPRATROPIUM BROMIDE AND ALBUTEROL SULFATE 3 MILLILITER(S): .5; 2.5 SOLUTION RESPIRATORY (INHALATION) at 10:30

## 2025-07-23 RX ADMIN — IPRATROPIUM BROMIDE AND ALBUTEROL SULFATE 3 MILLILITER(S): .5; 2.5 SOLUTION RESPIRATORY (INHALATION) at 11:00

## 2025-07-23 RX ADMIN — IPRATROPIUM BROMIDE AND ALBUTEROL SULFATE 3 MILLILITER(S): .5; 2.5 SOLUTION RESPIRATORY (INHALATION) at 10:48

## 2025-07-23 NOTE — ED PROVIDER NOTE - PHYSICAL EXAMINATION
Vitals reviewed  Gen: fatigued but nontoxic appearing, nad, + conversational dyspnea, no hypoxia   Skin: wwp, no rash/lesions  HEENT: ncat, eomi, + frontal sinus ttp b/l, mmm, airway patent   CV:  irreg rhythm, reg rate  Resp: symmetrical expansion, diminished breath sounds throughout, + wheezing and coarse breath sounds  Abd: nondistended, soft/nt  Ext: FROM throughout, no peripheral edema or calf ttp, no muscle or joint ttp, distal pulses 2+  Neuro: alert/oriented, no focal deficits, steady gait

## 2025-07-23 NOTE — ED PROVIDER NOTE - CLINICAL SUMMARY MEDICAL DECISION MAKING FREE TEXT BOX
75 F pmh afib on eliquis, cholelithiasis, fibroids p/w cough, congestion, fatigue x 2 weeks.  on exam vss, afebrile, no hypoxia, + conversational dyspnea, .+ frontal sinus ttp, irreg rhythm, reg rate, Resp: symmetrical expansion, diminished breath sounds throughout, + wheezing and coarse breath sounds, no LE edema/calf ttp.  suspect viral syndrome w/ reactive airway/bronchitis, r/o pneumonia, clinical sinusitis. r/o acs but less likely.  clinically not fluid overloaded.  will obtain labs, ekg, cxr, duonebs, prednisone.     labs w/ neg trop, 1804 bnp but cxr no congestion or focal infiltrate.  ekg afib, rate 88.  pt feeling much better and significant improvement air movement and decreased wheezing.  will dc w/ prednisone, albuterol mdi and augmentin for sinusitis.  f/u with pmd.  discussed strict return parameters

## 2025-07-23 NOTE — ED PROVIDER NOTE - OBJECTIVE STATEMENT
75 F pmh afib on eliquis, cholelithiasis, fibroids p/w cough, congestion, fatigue x 2 weeks.  pt reports productive cough initially w/ green sputum now clear.  also w/ nasal congestion and facial pain w/ clear rhinorrhea.  + bodyaches, anorexia and nausea.  + nausea w/ coughing, no vomiting.  denies chest pain but reports sob worse w/ exertion.  + subjective fevers, no chills.  denies dizziness, fainting, chest pain, palpitations, abd pain, leg pain/swelling, sick contacts, travel, trauma

## 2025-07-23 NOTE — ED PROVIDER NOTE - NSFOLLOWUPINSTRUCTIONS_ED_ALL_ED_FT
Descanse y manténgase moris hidratado con abundante líquido. Puede sacha Motrin 600-800 mg y Tylenol 650 mg cada 3 horas, alternando entre ambos si presenta dolor/dolor corporal o fiebre (>38 °C).    Raymond el tratamiento completo de antibióticos según lo prescrito para la infección sinusal.  Raymond prednisona según lo prescrito.    Use la bomba de albuterol, 2 inhalaciones cada 6 horas para la tos, dificultad para respirar y sibilancias.    Llame para programar senthil katty de seguimiento con duran médico de cabecera en el plazo de senthil semana.    Bronquitis aguda    LO QUE NECESITA SABER:    ¿Qué es la bronquitis aguda?La bronquitis aguda es la inflamación e irritación en los pulmones. Normalmente es causada por un virus y ocurre más a menudo en el invierno. La bronquitis también puede ser causada por bacterias o por un irritante químico, con el humo.    ¿Cuáles son los signos y síntomas de la bronquitis aguda?    Tos que dura hasta 3 semanas    Secreción nasal o nariz tapada    Ronquera, dolor de garganta    Fiebre    Sentirse más cansado de lo normal y yuriy corporales    Sibilancias o dolor al respirar o toser  ¿Cómo se trata la bronquitis crónica?Es posible que usted necesite alguno de los siguientes:    Los jarabes para la tosdisminuyen duran necesidad de toser.    Los descongestivosayudan a despejar la mucosidad en shaylee pulmones y que sea más fácil expectorarla. [place] puede ayudarle a respirar mejor.    Inhaladorespodrían administrarse. Duran médico podría darle teressa o más inhaladores para ayudarle a respirar más fácil y tener menos tos. Un inhalador le administra medicamento para abrir shaylee vías aéreas. Pídale a duran médico que le muestre cómo usar duran inhalador correctamente.  Inhalador de dosis medidas      Medicamentos antiviralestratan las infecciones provocadas por un virus.    Los antibióticospueden administrarse si duran bronquitis es causada por bacterias o si tiene senthil afección pulmonar.    Acetaminofénalivia el dolor y baja la fiebre. Está disponible sin receta médica. Pregunte la cantidad y la frecuencia con que debe tomarlos. Siga las indicaciones. Mohit las etiquetas de todos los demás medicamentos que esté usando para saber si también contienen acetaminofén, o pregunte a duran médico o farmacéutico. El acetaminofén puede causar daño en el hígado cuando no se klarissa de forma correcta.    AINEpueden disminuir la inflamación y el dolor o la fiebre. Nandini medicamento está disponible con o sin senthil receta médica. Los BETTINA pueden causar sangrado estomacal o problemas renales en ciertas personas. Si usted klarissa un medicamento anticoagulante, siempre pregúntele a duran médico si los BETTINA son seguros para usted. Siempre mohit la etiqueta de nandini medicamento y siga las instrucciones.  ¿Cómo puedo controlar los síntomas?    Raymond líquidos con se le haya indicado.Es posible que deba sacha más líquido que de costumbre para mantenerse hidratado. Pregunte cuánto líquido debe sacha cada día y cuáles líquidos son los más adecuados para usted.    Use un humidificador de vapor frío.Nandini aumenta el nivel de humedad en el aire de duran hogar. [place] podría ayudarle a respirar más fácilmente y al mismo tiempo disminuir la tos.  Humidificador      Descanse más.El descanso ayuda a duran cuerpo a sanar. Empiece a hacer un poco más día a día. Descanse cuando usted sienta que es necesario.  ¿Qué puedo ayudar a prevenir la bronquitis aguda?    Pregunte sobre las vacunas que pudiera necesitar.Duran médico puede recomendarle estas y otras vacunas:  Pregunte a duran médico sobre las vacunas para gripe y neumonía.Todos los adultos deben recibir la vacuna contra la gripe (influenza) tan pronto con se recomiende cada año, generalmente en septiembre u octubre. Se recomienda la vacuna contra la neumonía para todos los adultos de 50 años o más, para prevenir la enfermedad neumocócica con la neumonía. Los adultos de entre 19 y 49 años que están en alto riesgo de enfermedad neumocócica también deberían recibir la vacuna. Es posible que necesite 1 dosis o 2. El número depende de la vacuna utilizada y de shaylee factores de riesgo.    Las vacunas contra la COVID-19 se administran a los adultos en forma de inyección.Se recomienda al menos 1 dosis de senthil vacuna actualizada para todas los adultos. Las vacunas contra la COVID-19 se actualizan a lo kip del año. Los adultos de 65 años o más necesitan senthil segunda dosis de vacuna actualizada al menos 4 meses después de la primera dosis. Duran médico puede ayudarle a programar todas las dosis necesarias a medida que se disponga de vacunas actualizadas.    Evite la propagación de gérmenes.  Lávese las pablo frecuentemente con agua y jabón. Lleve senthil loción o gel antiséptico para las pablo. Usted puede usar la loción o el gel para limpiar shaylee pablo cuando no haya agua disponible.  Lavado de pablo      No se toque los ojos, la nariz o la boca a menos que se haya lavado las pablo lila.    Siempre cubra duran boca al toser para evitar la propagación de gérmenes. Lo mejor es toser en un pañuelo desechable o en la manga de duran camisa, en lugar de en duran mano. Pídale a los que le rodean que cubran shaylee bocas al toser.    Trate de evitar a las personas que están resfriadas o tienen gripe. Si usted está enfermo, manténgase alejado de otras personas lo más que pueda.    Evite irritantes en el aire.Evite productos químicos, gases y polvo. Use senthil mascarilla si tiene que trabajar alrededor de polvo o gases. Permanezca dentro cuando los niveles de contaminación tiffanie altos. Si tiene alergias, permanezca adentro cuando el conteo de polen sea alto. No use productos en aerosol, con desodorante, aerosol contra los insectos y aerosol para el rc.    No fume ni esté alrededor de personas que fuman.La nicotina y otras sustancias químicas que contienen los cigarrillos y cigarros pueden dañar los pulmones. Pida información a duran médico si usted actualmente fuma y necesita ayuda para dejar de fumar. Los cigarrillos electrónicos o el tabaco sin humo igualmente contienen nicotina. Consulte con duran médico antes de utilizar estos productos.    ¿Cuándo chun buscar atención inmediata?    Usted expectora feroz.    Shaylee labios o uñas de los dedos se ponen azules.    Usted siente que no está recibiendo suficiente aire cuando respira.  ¿Cuándo chun llamar a mi médico?    Shaylee síntomas no desaparecen o empeoran, incluso después del tratamiento.    Duran tos no mejora dentro de 4 semanas.    Usted tiene preguntas o inquietudes acerca de duran condición o cuidado.  ACUERDOS SOBRE DURAN CUIDADO:    Usted tiene el derecho de ayudar a planear duran cuidado. Aprenda todo lo que pueda sobre duran condición y con darle tratamiento. Discuta shaylee opciones de tratamiento con shaylee médicos para decidir el cuidado que usted desea recibir. Usted siempre tiene el derecho de rechazar el tratamiento.

## 2025-07-23 NOTE — ED ADULT NURSE NOTE - OBJECTIVE STATEMENT
75yoF PMH afib ( on eliquis) , cholelithiasis, fibroids came to ED c/o flu - like symptoms x 2 weeks, Pt c/o dry cough, runny nose, fatigue, body aches, and loss of appetite.  Pt talking in full sentences. Respirations even and unlabored. Pt denies chest pain . fevers.

## 2025-07-23 NOTE — ED ADULT TRIAGE NOTE - CHIEF COMPLAINT QUOTE
Per pt's daughter Yoko at side. Hx Afib, HTN, on Eliquis. Pt endorses dry cough x2 week, nasal congestion, SOB on exertion, increased fatigue since Monday. denies fever, chills, chest pain.

## 2025-07-23 NOTE — ED PROVIDER NOTE - PATIENT PORTAL LINK FT
You can access the FollowMyHealth Patient Portal offered by [facility] by registering at the following website: [email or web address]. By joining [employer]’s FollowMyHealth portal, you will also be able to view your health information using other applications (apps) compatible with our system.

## 2025-07-23 NOTE — ED PROVIDER NOTE - ATTENDING APP SHARED VISIT CONTRIBUTION OF CARE
I discussed the plan of care of the patient directly with the PA while the patient was in the Emergency Department. I have reviewed the ACP note and agree with the history, exam and plan of care. I performed a substantive portion of the MDM. Here w/ cough and SOB, wheezing on exam, no prior hx of asthma/COPD, non smoker. trop/bnp and CXR not consistent w/ CHF. Felt better with albuterol with resolution of SOB. +sinus pressure and symptoms 2 weeks now, plan for abx and dc home w/ inhaler/steroids.

## 2025-07-25 DIAGNOSIS — D21.9 BENIGN NEOPLASM OF CONNECTIVE AND OTHER SOFT TISSUE, UNSPECIFIED: ICD-10-CM

## 2025-07-25 DIAGNOSIS — I48.91 UNSPECIFIED ATRIAL FIBRILLATION: ICD-10-CM

## 2025-07-25 DIAGNOSIS — J32.9 CHRONIC SINUSITIS, UNSPECIFIED: ICD-10-CM

## 2025-07-25 DIAGNOSIS — Z79.01 LONG TERM (CURRENT) USE OF ANTICOAGULANTS: ICD-10-CM

## 2025-07-25 DIAGNOSIS — J40 BRONCHITIS, NOT SPECIFIED AS ACUTE OR CHRONIC: ICD-10-CM

## 2025-07-25 DIAGNOSIS — R63.0 ANOREXIA: ICD-10-CM

## 2025-07-25 DIAGNOSIS — Z87.19 PERSONAL HISTORY OF OTHER DISEASES OF THE DIGESTIVE SYSTEM: ICD-10-CM

## 2025-07-25 DIAGNOSIS — R05.1 ACUTE COUGH: ICD-10-CM

## 2025-07-25 DIAGNOSIS — R11.0 NAUSEA: ICD-10-CM

## 2025-07-25 PROBLEM — Z78.9 OTHER SPECIFIED HEALTH STATUS: Chronic | Status: ACTIVE | Noted: 2025-07-23

## 2025-07-30 ENCOUNTER — RESULT REVIEW (OUTPATIENT)
Age: 75
End: 2025-07-30

## 2025-07-30 ENCOUNTER — OUTPATIENT (OUTPATIENT)
Dept: OUTPATIENT SERVICES | Facility: HOSPITAL | Age: 75
LOS: 1 days | Discharge: HOME | End: 2025-07-30
Payer: MEDICARE

## 2025-07-30 ENCOUNTER — APPOINTMENT (OUTPATIENT)
Dept: GASTROENTEROLOGY | Facility: HOSPITAL | Age: 75
End: 2025-07-30

## 2025-07-30 PROCEDURE — 88305 TISSUE EXAM BY PATHOLOGIST: CPT | Mod: 26

## 2025-07-30 PROCEDURE — 43239 EGD BIOPSY SINGLE/MULTIPLE: CPT

## 2025-07-30 PROCEDURE — 88305 TISSUE EXAM BY PATHOLOGIST: CPT

## 2025-09-10 ENCOUNTER — APPOINTMENT (OUTPATIENT)
Dept: GASTROENTEROLOGY | Facility: HOSPITAL | Age: 75
End: 2025-09-10

## 2025-09-10 ENCOUNTER — OUTPATIENT (OUTPATIENT)
Dept: OUTPATIENT SERVICES | Facility: HOSPITAL | Age: 75
LOS: 1 days | Discharge: HOME | End: 2025-09-10
Payer: MEDICARE

## 2025-09-10 PROCEDURE — 45378 DIAGNOSTIC COLONOSCOPY: CPT

## (undated) DEVICE — FORCEP RADIAL JAW 4 W NDL 2.2MM 2.8MM 240CM ORANGE DISP